# Patient Record
Sex: FEMALE | Race: BLACK OR AFRICAN AMERICAN | NOT HISPANIC OR LATINO | URBAN - METROPOLITAN AREA
[De-identification: names, ages, dates, MRNs, and addresses within clinical notes are randomized per-mention and may not be internally consistent; named-entity substitution may affect disease eponyms.]

---

## 2019-10-10 ENCOUNTER — EMERGENCY (EMERGENCY)
Facility: HOSPITAL | Age: 29
LOS: 1 days | Discharge: HOME | End: 2019-10-10
Attending: EMERGENCY MEDICINE | Admitting: EMERGENCY MEDICINE
Payer: COMMERCIAL

## 2019-10-10 VITALS
RESPIRATION RATE: 14 BRPM | HEART RATE: 86 BPM | OXYGEN SATURATION: 100 % | SYSTOLIC BLOOD PRESSURE: 123 MMHG | DIASTOLIC BLOOD PRESSURE: 82 MMHG | TEMPERATURE: 98 F

## 2019-10-10 LAB
ALBUMIN SERPL ELPH-MCNC: 4 G/DL — SIGNIFICANT CHANGE UP (ref 3.3–5)
ALP SERPL-CCNC: 60 U/L — SIGNIFICANT CHANGE UP (ref 40–120)
ALT FLD-CCNC: 18 U/L — SIGNIFICANT CHANGE UP (ref 4–33)
ANION GAP SERPL CALC-SCNC: 18 MMO/L — HIGH (ref 7–14)
APTT BLD: 26.3 SEC — LOW (ref 27.5–36.3)
AST SERPL-CCNC: 38 U/L — HIGH (ref 4–32)
BASE EXCESS BLDV CALC-SCNC: -5.7 MMOL/L — SIGNIFICANT CHANGE UP
BASOPHILS # BLD AUTO: 0.04 K/UL — SIGNIFICANT CHANGE UP (ref 0–0.2)
BASOPHILS NFR BLD AUTO: 0.4 % — SIGNIFICANT CHANGE UP (ref 0–2)
BILIRUB SERPL-MCNC: < 0.2 MG/DL — LOW (ref 0.2–1.2)
BLD GP AB SCN SERPL QL: NEGATIVE — SIGNIFICANT CHANGE UP
BLOOD GAS VENOUS - CREATININE: 1.15 MG/DL — SIGNIFICANT CHANGE UP (ref 0.5–1.3)
BLOOD GAS VENOUS - FIO2: 21 — SIGNIFICANT CHANGE UP
BUN SERPL-MCNC: 12 MG/DL — SIGNIFICANT CHANGE UP (ref 7–23)
CALCIUM SERPL-MCNC: 9.1 MG/DL — SIGNIFICANT CHANGE UP (ref 8.4–10.5)
CHLORIDE BLDV-SCNC: 112 MMOL/L — HIGH (ref 96–108)
CHLORIDE SERPL-SCNC: 104 MMOL/L — SIGNIFICANT CHANGE UP (ref 98–107)
CO2 SERPL-SCNC: 16 MMOL/L — LOW (ref 22–31)
CREAT SERPL-MCNC: 1.19 MG/DL — SIGNIFICANT CHANGE UP (ref 0.5–1.3)
EOSINOPHIL # BLD AUTO: 0.05 K/UL — SIGNIFICANT CHANGE UP (ref 0–0.5)
EOSINOPHIL NFR BLD AUTO: 0.5 % — SIGNIFICANT CHANGE UP (ref 0–6)
ETHANOL BLD-MCNC: 145 MG/DL — HIGH
GAS PNL BLDV: 137 MMOL/L — SIGNIFICANT CHANGE UP (ref 136–146)
GLUCOSE BLDV-MCNC: 99 MG/DL — SIGNIFICANT CHANGE UP (ref 70–99)
GLUCOSE SERPL-MCNC: 101 MG/DL — HIGH (ref 70–99)
HCO3 BLDV-SCNC: 20 MMOL/L — SIGNIFICANT CHANGE UP (ref 20–27)
HCT VFR BLD CALC: 38.6 % — SIGNIFICANT CHANGE UP (ref 34.5–45)
HCT VFR BLDV CALC: 40 % — SIGNIFICANT CHANGE UP (ref 34.5–45)
HGB BLD-MCNC: 12.8 G/DL — SIGNIFICANT CHANGE UP (ref 11.5–15.5)
HGB BLDV-MCNC: 13 G/DL — SIGNIFICANT CHANGE UP (ref 11.5–15.5)
IMM GRANULOCYTES NFR BLD AUTO: 0.3 % — SIGNIFICANT CHANGE UP (ref 0–1.5)
INR BLD: 0.96 — SIGNIFICANT CHANGE UP (ref 0.88–1.17)
LACTATE BLDV-MCNC: 4.1 MMOL/L — CRITICAL HIGH (ref 0.5–2)
LIDOCAIN IGE QN: 34.4 U/L — SIGNIFICANT CHANGE UP (ref 7–60)
LYMPHOCYTES # BLD AUTO: 3.59 K/UL — HIGH (ref 1–3.3)
LYMPHOCYTES # BLD AUTO: 34.4 % — SIGNIFICANT CHANGE UP (ref 13–44)
MCHC RBC-ENTMCNC: 30.1 PG — SIGNIFICANT CHANGE UP (ref 27–34)
MCHC RBC-ENTMCNC: 33.2 % — SIGNIFICANT CHANGE UP (ref 32–36)
MCV RBC AUTO: 90.8 FL — SIGNIFICANT CHANGE UP (ref 80–100)
MONOCYTES # BLD AUTO: 0.8 K/UL — SIGNIFICANT CHANGE UP (ref 0–0.9)
MONOCYTES NFR BLD AUTO: 7.7 % — SIGNIFICANT CHANGE UP (ref 2–14)
NEUTROPHILS # BLD AUTO: 5.92 K/UL — SIGNIFICANT CHANGE UP (ref 1.8–7.4)
NEUTROPHILS NFR BLD AUTO: 56.7 % — SIGNIFICANT CHANGE UP (ref 43–77)
NRBC # FLD: 0 K/UL — SIGNIFICANT CHANGE UP (ref 0–0)
PCO2 BLDV: 37 MMHG — LOW (ref 41–51)
PH BLDV: 7.34 PH — SIGNIFICANT CHANGE UP (ref 7.32–7.43)
PLATELET # BLD AUTO: 336 K/UL — SIGNIFICANT CHANGE UP (ref 150–400)
PMV BLD: 10.5 FL — SIGNIFICANT CHANGE UP (ref 7–13)
PO2 BLDV: 68 MMHG — HIGH (ref 35–40)
POTASSIUM BLDV-SCNC: 7.2 MMOL/L — CRITICAL HIGH (ref 3.4–4.5)
POTASSIUM SERPL-MCNC: 5.3 MMOL/L — SIGNIFICANT CHANGE UP (ref 3.5–5.3)
POTASSIUM SERPL-SCNC: 5.3 MMOL/L — SIGNIFICANT CHANGE UP (ref 3.5–5.3)
PROT SERPL-MCNC: 7.8 G/DL — SIGNIFICANT CHANGE UP (ref 6–8.3)
PROTHROM AB SERPL-ACNC: 10.9 SEC — SIGNIFICANT CHANGE UP (ref 9.8–13.1)
RBC # BLD: 4.25 M/UL — SIGNIFICANT CHANGE UP (ref 3.8–5.2)
RBC # FLD: 13.5 % — SIGNIFICANT CHANGE UP (ref 10.3–14.5)
RH IG SCN BLD-IMP: POSITIVE — SIGNIFICANT CHANGE UP
SAO2 % BLDV: 90.5 % — HIGH (ref 60–85)
SODIUM SERPL-SCNC: 138 MMOL/L — SIGNIFICANT CHANGE UP (ref 135–145)
WBC # BLD: 10.43 K/UL — SIGNIFICANT CHANGE UP (ref 3.8–10.5)
WBC # FLD AUTO: 10.43 K/UL — SIGNIFICANT CHANGE UP (ref 3.8–10.5)

## 2019-10-10 PROCEDURE — 72125 CT NECK SPINE W/O DYE: CPT | Mod: 26

## 2019-10-10 PROCEDURE — 73110 X-RAY EXAM OF WRIST: CPT | Mod: 26,RT

## 2019-10-10 PROCEDURE — 70450 CT HEAD/BRAIN W/O DYE: CPT | Mod: 26

## 2019-10-10 PROCEDURE — 73090 X-RAY EXAM OF FOREARM: CPT | Mod: 26,RT,76

## 2019-10-10 PROCEDURE — 73060 X-RAY EXAM OF HUMERUS: CPT | Mod: 26,RT

## 2019-10-10 PROCEDURE — 73030 X-RAY EXAM OF SHOULDER: CPT | Mod: 26,RT

## 2019-10-10 PROCEDURE — 71045 X-RAY EXAM CHEST 1 VIEW: CPT | Mod: 26

## 2019-10-10 PROCEDURE — 99285 EMERGENCY DEPT VISIT HI MDM: CPT

## 2019-10-10 PROCEDURE — 73120 X-RAY EXAM OF HAND: CPT | Mod: 26,RT

## 2019-10-10 PROCEDURE — 73130 X-RAY EXAM OF HAND: CPT | Mod: 26,RT

## 2019-10-10 RX ORDER — FENTANYL CITRATE 50 UG/ML
50 INJECTION INTRAVENOUS ONCE
Refills: 0 | Status: DISCONTINUED | OUTPATIENT
Start: 2019-10-10 | End: 2019-10-10

## 2019-10-10 RX ORDER — TETANUS TOXOID, REDUCED DIPHTHERIA TOXOID AND ACELLULAR PERTUSSIS VACCINE, ADSORBED 5; 2.5; 8; 8; 2.5 [IU]/.5ML; [IU]/.5ML; UG/.5ML; UG/.5ML; UG/.5ML
0.5 SUSPENSION INTRAMUSCULAR ONCE
Refills: 0 | Status: COMPLETED | OUTPATIENT
Start: 2019-10-10 | End: 2019-10-10

## 2019-10-10 RX ORDER — SODIUM CHLORIDE 9 MG/ML
1000 INJECTION INTRAMUSCULAR; INTRAVENOUS; SUBCUTANEOUS ONCE
Refills: 0 | Status: COMPLETED | OUTPATIENT
Start: 2019-10-10 | End: 2019-10-10

## 2019-10-10 RX ADMIN — FENTANYL CITRATE 50 MICROGRAM(S): 50 INJECTION INTRAVENOUS at 20:07

## 2019-10-10 RX ADMIN — FENTANYL CITRATE 50 MICROGRAM(S): 50 INJECTION INTRAVENOUS at 21:15

## 2019-10-10 RX ADMIN — SODIUM CHLORIDE 1000 MILLILITER(S): 9 INJECTION INTRAMUSCULAR; INTRAVENOUS; SUBCUTANEOUS at 21:17

## 2019-10-10 RX ADMIN — TETANUS TOXOID, REDUCED DIPHTHERIA TOXOID AND ACELLULAR PERTUSSIS VACCINE, ADSORBED 0.5 MILLILITER(S): 5; 2.5; 8; 8; 2.5 SUSPENSION INTRAMUSCULAR at 21:16

## 2019-10-10 NOTE — ED PROVIDER NOTE - CLINICAL SUMMARY MEDICAL DECISION MAKING FREE TEXT BOX
Patient presents to ed s/p accidental trip/fall down stairs, +head trauma, +r. wrist pain. C-collar placed, r. wrist elevated-nvi, compartments soft, concern for possible wrist fx. Otherwise neuro intact. Given mechanism and head trauma, will ct head, c-spine, xr RUE, chest/pelvis, pain ctrl, reass.

## 2019-10-10 NOTE — ED PROVIDER NOTE - OBJECTIVE STATEMENT
30 y/o f presents with wrist pain/arm pain s/p fall down stairs. On arrival to room 5, patient screaming stating she has pain to her arm. Per patient-she fell down the stairs after her foot got caught in carpet-approx 10 stairs, doesn't remember exactly how she landed but states she hit her head on the wall and put her hands out to stop her fall and her r. arm got caught. Does not think she lost consciousness. Now is c/o severe r. wrist/forearm pain, constant, radiates to hand. No numbness, able to feel her arm/wrist/fingers. Denies any vision changes, neck pain or stiffness, weakness, cp, sob, abd pain, vomiting, bowel/bladder incont. Ems was called at the time of the fall but patient declined the ride. Then mother brought her in by car. Patient was placed in c-collar on arrival. Tetanus out of date.

## 2019-10-10 NOTE — ED PROVIDER NOTE - PHYSICAL EXAMINATION
GEN - crying 2/2 pain, ao3  HEAD - hematoma to r. forehead, no other findings  EYES- PERRL, EOMI  ENT: Airway patent, mmm, Oral cavity and pharynx normal.    NECK: Neck supple, no midline ttp, no deformity/stepoff  PULMONARY - CTA b/l, symmetric breath sounds.   CARDIAC -s1s2, RRR, no M,G,R, no chest wall tenderness  ABDOMEN - +BS, ND, NT, soft, no guarding  BACK - no CVA tenderness, Normal  spine   EXTREMITIES - LUE, LLE, RLE with no tenderness, from. RUE-shoulder/humerus/elbow with from, no tenderness. R. wrist appears swollen, compartments soft, skin intact, tender to diffuse wrist region. Sensation intact, cap refill <2s, no crepitus, all finger moving, NVI.  SKIN - abrasions to face, no active bleeding.  NEUROLOGIC - alert, speech clear, 5/5 strength to lue, lle, rle, (RUE limited by pain), sensation grossly intact, cn2-12 intact.  PSYCH -crying, ao3.

## 2019-10-10 NOTE — ED ADULT TRIAGE NOTE - NS ED TRIAGE CLINICAL UPGRADE PROVIDER FT
Patient eyes started rolling back, became very pale and diaphoretic, panic button pressed- pt brought back to critical area. charge RN aware,

## 2019-10-10 NOTE — ED ADULT TRIAGE NOTE - CHIEF COMPLAINT QUOTE
pt fell down 10 steps today, c/o R wrist pain, has area of swelling and ecchymosis to R forehead, abrasions to R side of face. Denies LOC/ vision changes/ HA/ N/V/dizziness.

## 2019-10-10 NOTE — ED ADULT NURSE NOTE - OBJECTIVE STATEMENT
29yof a&ox3 presents to ed 5 s/p fall down 10 steps. in waiting room pt reportedly became pale, diaphoretic and weak. pt put in wheelchair and brought directly to room 5 w/ MD at bedside for eval. pt crying in pain upon arrival, IV lock placed and medicated for pain. pt reprots she slipped down the stairs and "caught" her arm while falling. pt reports severe pain from R elbow to R finger tips. + pulses, + sensation, equal b/l pt R wrist notably swollen, no obvious deformity. pt with hematoma to R forehead and scratches to R cheek and R leg. bruise noted to L thigh. scratch to L elbow. no active bleeding. pt family at bedside reports pt is "not herself" reports pt speech sounds slurred. pt brought to xray. report given to night RN.

## 2019-10-10 NOTE — ED PROVIDER NOTE - CARE PROVIDER_API CALL
Che Bowman)  Orthopedics  [address] [place] [address], [address]  [place] [address]  Phone: [phone]  Fax: [phone]  Follow Up Time:

## 2019-10-10 NOTE — ED PROVIDER NOTE - PROGRESS NOTE DETAILS
patient given fentanyl for pain, appears more comfortable, no longer crying. Nazanin Ragsdale M.D. Resident: Pt given percocet in ER and rx for oxycodone at home, pt ambulatory, well appearing, will DC home with ortho follow up in 1 week patient given fentanyl for pain, appears more comfortable, no longer crying. seen and treated by orthopedics for fx.

## 2019-10-10 NOTE — ED PROVIDER NOTE - NS ED ROS FT
ROS:  GENERAL: No fever, no chills  EYES: no change in vision  HEENT: no trouble swallowing, no trouble speaking  CARDIAC: no chest pain  PULMONARY: no cough, no shortness of breath  GI: no abdominal pain, no nausea, no vomiting, no diarrhea, no constipation  : No dysuria, no frequency, no change in appearance, or odor of urine  SKIN: no rashes  NEURO: no headache, no weakness  MSK: +r. wrist pain

## 2019-10-10 NOTE — ED PROVIDER NOTE - PATIENT PORTAL LINK FT
You can access the FollowMyHealth Patient Portal offered by [facility] by registering at the following website: [email or web address]. By joining [facility]’s FollowMyHealth portal, you will also be able to view your health information using other applications (apps) compatible with our system.

## 2019-10-10 NOTE — CONSULT NOTE ADULT - SUBJECTIVE AND OBJECTIVE BOX
HPI: 29y year old Female w/ hx of HTN, RHD s/p mechanical fall down 10 stairs.  Landed on outstretched hand.  Patient had deformity and pain in right wrist.  Patient denies pain in any other joint, numbness, tingling, paresthesias. No pain in any other extremities.  +Head trauma, no LOC. CT head and C-spine done and patient in C-collar.    PMH:  No pertinent past medical history    PSH:  No significant past surgical history    Allergies:  No Known Allergies    O:  T(C): 36.6 (10-10-19 @ 19:25), Max: 36.6 (10-10-19 @ 19:25)  HR: 86 (10-10-19 @ 19:25) (86 - 86)  BP: 123/82 (10-10-19 @ 19:25) (123/82 - 123/82)  RR: 14 (10-10-19 @ 19:25) (14 - 14)  SpO2: 100% (10-10-19 @ 19:25) (100% - 100%)    Gen  RUE  AIN/PIN/U Intact  SILT M/U/R  Radial pulse palpable, WWP distally  Skin intact  Swollen wrist    Imaging: XRs of the RUE show a comminuted intra-articular distal radius fracture    Patient underwent hematoma block.  Injected with 10 cc of 1% lidocaine without epinephrine into hematoma.  Hung in traction and reduced. Patient placed in sugartong splint.  Post reduction x-rays reviewed in improved alignment.    A/P 29y year old man with right distal radius fracture    Pain Control  NWB RUE  Sling for comfort  F/u as outpatient in 1 week with Dr. Bowman ([phone])  Discussed with patient that she will benefit from surgical management of her distal radius fracture    Serge Unger MD

## 2019-10-10 NOTE — ED PROVIDER NOTE - NSFOLLOWUPINSTRUCTIONS_ED_ALL_ED_FT
You were seen in the Emergency Department for a distal radius fracture.   1) Advance activity as tolerated.    2) Continue all previously prescribed medications as directed.  Take tylenol and motrin as directed for pain. You may also take oxycodone as directed for breakthrough pain.  3) Follow up with Dr. Bowman in one week - take copies of your results.  4) Return to the Emergency Department for worsening or persistent symptoms, and/or ANY NEW OR CONCERNING SYMPTOMS. If you have issues obtaining follow up, please call: [phone] to obtain a doctor or specialist who takes your insurance in your area.

## 2019-10-10 NOTE — CONSULT NOTE ADULT - ATTENDING COMMENTS
Agree with above. Patient will followup in clinic today and likely require surgery next week  on an outpatient basis.    KELSEY Bowman MD  Orthopedic Surgery

## 2019-10-11 ENCOUNTER — APPOINTMENT (OUTPATIENT)
Dept: ORTHOPEDIC SURGERY | Facility: CLINIC | Age: 29
End: 2019-10-11
Payer: COMMERCIAL

## 2019-10-11 VITALS
RESPIRATION RATE: 14 BRPM | OXYGEN SATURATION: 98 % | TEMPERATURE: 99 F | HEART RATE: 83 BPM | SYSTOLIC BLOOD PRESSURE: 103 MMHG | DIASTOLIC BLOOD PRESSURE: 63 MMHG

## 2019-10-11 VITALS
BODY MASS INDEX: 38.25 KG/M2 | DIASTOLIC BLOOD PRESSURE: 80 MMHG | HEIGHT: 66 IN | HEART RATE: 111 BPM | SYSTOLIC BLOOD PRESSURE: 117 MMHG | WEIGHT: 238 LBS

## 2019-10-11 DIAGNOSIS — Z78.9 OTHER SPECIFIED HEALTH STATUS: ICD-10-CM

## 2019-10-11 DIAGNOSIS — Z86.79 PERSONAL HISTORY OF OTHER DISEASES OF THE CIRCULATORY SYSTEM: ICD-10-CM

## 2019-10-11 PROBLEM — Z00.00 ENCOUNTER FOR PREVENTIVE HEALTH EXAMINATION: Status: ACTIVE | Noted: 2019-10-11

## 2019-10-11 LAB
APPEARANCE UR: CLEAR — SIGNIFICANT CHANGE UP
BACTERIA # UR AUTO: NEGATIVE — SIGNIFICANT CHANGE UP
BASE EXCESS BLDV CALC-SCNC: -3.3 MMOL/L — SIGNIFICANT CHANGE UP
BILIRUB UR-MCNC: NEGATIVE — SIGNIFICANT CHANGE UP
BLOOD GAS VENOUS - CREATININE: 0.77 MG/DL — SIGNIFICANT CHANGE UP (ref 0.5–1.3)
BLOOD GAS VENOUS - FIO2: 21 — SIGNIFICANT CHANGE UP
BLOOD UR QL VISUAL: HIGH
CHLORIDE BLDV-SCNC: 109 MMOL/L — HIGH (ref 96–108)
COLOR SPEC: SIGNIFICANT CHANGE UP
GAS PNL BLDV: 137 MMOL/L — SIGNIFICANT CHANGE UP (ref 136–146)
GLUCOSE BLDV-MCNC: 88 MG/DL — SIGNIFICANT CHANGE UP (ref 70–99)
GLUCOSE UR-MCNC: NEGATIVE — SIGNIFICANT CHANGE UP
HCG SERPL-ACNC: < 5 MIU/ML — SIGNIFICANT CHANGE UP
HCO3 BLDV-SCNC: 21 MMOL/L — SIGNIFICANT CHANGE UP (ref 20–27)
HCT VFR BLDV CALC: 37.2 % — SIGNIFICANT CHANGE UP (ref 34.5–45)
HGB BLDV-MCNC: 12.1 G/DL — SIGNIFICANT CHANGE UP (ref 11.5–15.5)
HYALINE CASTS # UR AUTO: NEGATIVE — SIGNIFICANT CHANGE UP
KETONES UR-MCNC: NEGATIVE — SIGNIFICANT CHANGE UP
LACTATE BLDV-MCNC: 2.4 MMOL/L — HIGH (ref 0.5–2)
LEUKOCYTE ESTERASE UR-ACNC: NEGATIVE — SIGNIFICANT CHANGE UP
NITRITE UR-MCNC: NEGATIVE — SIGNIFICANT CHANGE UP
PCO2 BLDV: 37 MMHG — LOW (ref 41–51)
PH BLDV: 7.38 PH — SIGNIFICANT CHANGE UP (ref 7.32–7.43)
PH UR: 6.5 — SIGNIFICANT CHANGE UP (ref 5–8)
PO2 BLDV: 46 MMHG — HIGH (ref 35–40)
POTASSIUM BLDV-SCNC: 4 MMOL/L — SIGNIFICANT CHANGE UP (ref 3.4–4.5)
PROT UR-MCNC: NEGATIVE — SIGNIFICANT CHANGE UP
RBC CASTS # UR COMP ASSIST: SIGNIFICANT CHANGE UP (ref 0–?)
SAO2 % BLDV: 75.1 % — SIGNIFICANT CHANGE UP (ref 60–85)
SP GR SPEC: 1.02 — SIGNIFICANT CHANGE UP (ref 1–1.04)
SQUAMOUS # UR AUTO: SIGNIFICANT CHANGE UP
UROBILINOGEN FLD QL: NORMAL — SIGNIFICANT CHANGE UP
WBC UR QL: SIGNIFICANT CHANGE UP (ref 0–?)

## 2019-10-11 PROCEDURE — 99203 OFFICE O/P NEW LOW 30 MIN: CPT

## 2019-10-11 RX ORDER — OXYCODONE HYDROCHLORIDE 5 MG/1
1 TABLET ORAL
Qty: 10 | Refills: 0
Start: 2019-10-11 | End: 2019-10-13

## 2019-10-11 RX ORDER — OXYCODONE AND ACETAMINOPHEN 5; 325 MG/1; MG/1
1 TABLET ORAL ONCE
Refills: 0 | Status: DISCONTINUED | OUTPATIENT
Start: 2019-10-11 | End: 2019-10-11

## 2019-10-11 RX ADMIN — OXYCODONE AND ACETAMINOPHEN 1 TABLET(S): 5; 325 TABLET ORAL at 01:07

## 2019-10-14 ENCOUNTER — OUTPATIENT (OUTPATIENT)
Dept: OUTPATIENT SERVICES | Facility: HOSPITAL | Age: 29
LOS: 1 days | End: 2019-10-14
Payer: COMMERCIAL

## 2019-10-14 ENCOUNTER — TRANSCRIPTION ENCOUNTER (OUTPATIENT)
Age: 29
End: 2019-10-14

## 2019-10-14 VITALS
RESPIRATION RATE: 16 BRPM | WEIGHT: 244.05 LBS | HEART RATE: 74 BPM | DIASTOLIC BLOOD PRESSURE: 85 MMHG | OXYGEN SATURATION: 98 % | TEMPERATURE: 98 F | HEIGHT: 66 IN | SYSTOLIC BLOOD PRESSURE: 138 MMHG

## 2019-10-14 DIAGNOSIS — S52.509A UNSPECIFIED FRACTURE OF THE LOWER END OF UNSPECIFIED RADIUS, INITIAL ENCOUNTER FOR CLOSED FRACTURE: ICD-10-CM

## 2019-10-14 DIAGNOSIS — Z98.890 OTHER SPECIFIED POSTPROCEDURAL STATES: Chronic | ICD-10-CM

## 2019-10-14 DIAGNOSIS — S52.501A UNSPECIFIED FRACTURE OF THE LOWER END OF RIGHT RADIUS, INITIAL ENCOUNTER FOR CLOSED FRACTURE: ICD-10-CM

## 2019-10-14 PROBLEM — Z78.9 DOES NOT USE ILLICIT DRUGS: Status: ACTIVE | Noted: 2019-10-11

## 2019-10-14 PROBLEM — Z78.9 CURRENT NON-SMOKER: Status: ACTIVE | Noted: 2019-10-11

## 2019-10-14 PROBLEM — Z86.79 HISTORY OF HYPERTENSION: Status: RESOLVED | Noted: 2019-10-11 | Resolved: 2019-10-14

## 2019-10-14 PROBLEM — Z78.9 CONSUMES ALCOHOL OCCASIONALLY: Status: ACTIVE | Noted: 2019-10-11

## 2019-10-14 PROCEDURE — G0463: CPT

## 2019-10-14 RX ORDER — CEFAZOLIN SODIUM 1 G
2000 VIAL (EA) INJECTION ONCE
Refills: 0 | Status: DISCONTINUED | OUTPATIENT
Start: 2019-10-15 | End: 2019-11-04

## 2019-10-14 RX ORDER — LIDOCAINE HCL 20 MG/ML
0.2 VIAL (ML) INJECTION ONCE
Refills: 0 | Status: DISCONTINUED | OUTPATIENT
Start: 2019-10-15 | End: 2019-11-04

## 2019-10-14 RX ORDER — CELECOXIB 200 MG/1
200 CAPSULE ORAL ONCE
Refills: 0 | Status: COMPLETED | OUTPATIENT
Start: 2019-10-15 | End: 2019-10-15

## 2019-10-14 RX ORDER — SODIUM CHLORIDE 9 MG/ML
3 INJECTION INTRAMUSCULAR; INTRAVENOUS; SUBCUTANEOUS EVERY 8 HOURS
Refills: 0 | Status: DISCONTINUED | OUTPATIENT
Start: 2019-10-15 | End: 2019-11-04

## 2019-10-14 RX ORDER — CHLORHEXIDINE GLUCONATE 213 G/1000ML
1 SOLUTION TOPICAL ONCE
Refills: 0 | Status: DISCONTINUED | OUTPATIENT
Start: 2019-10-15 | End: 2019-11-04

## 2019-10-14 RX ORDER — ACETAMINOPHEN 500 MG
1000 TABLET ORAL ONCE
Refills: 0 | Status: COMPLETED | OUTPATIENT
Start: 2019-10-15 | End: 2019-10-15

## 2019-10-14 NOTE — H&P PST ADULT - MUSCULOSKELETAL COMMENTS
hx fall sustained injury to right hand 10/10/2019 has hx shoulder and right rotator cuff tear  Sept 15,2019 get Physical therapy shoulder immobilizer arm sling  in place,  right hand  immobilizes with soft cast wearing  arm sling distal fingers positive for swelling  with good distal movement and circulation

## 2019-10-14 NOTE — ASSESSMENT
[FreeTextEntry1] : 29 year old female presents with right distal radius fracture. Treatment options were discussed, including operative and nonoperative treatment. At this time, the patient has opted for operative treatment. Risks, benefits and alternatives were discussed. Risks include but are not limited to the risks associated with anesthesia and the risks associated with the surgery. These include stiffness, need for additional procedures, damage to surrounding structures, infection, nonunion and malunion. I believe the patient understands these risks. The proposed procedure is right distal radius open reduction internal fixation. She will be scheduled at the next available date.

## 2019-10-14 NOTE — PHYSICAL EXAM
[de-identified] : Constitutional: Alert and in no acute distress.\par HEENT: scratches over her left face\par Psychiatric: Oriented to person, place, and time. Insight and judgement were intact and the affect was normal.\par Cardiovascular: Regular rate assessed through peripheral pulses.\par Pulmonary: Nonlabored breathing on room air.\par Lymphatics: No peripheral lymphadenopathy appreciated.\par \par Musculoskeletal: \par Splint not taken down. Remainder of the extremity is atraumatic. There is no tenderness over the volar tubercle of the scaphoid or anatomic snuffbox.\par \par Mobility is full about the shoulders and elbows. Digital flexion and extension is  limited. Thumb opposition is full to the base of the small fingers bilaterally. \par \par Sensation is intact to light touch in the ulnar, median, and radial nerve distributions. Motor is intact in the ulnar, median, and radial nerve distributions. Fingers are pink and well perfused. Capillary refill is brisk.\par   [de-identified] : Outside imaging reviewed show intraarticular distal radius fracture with continued malalignment after reduction attempt

## 2019-10-14 NOTE — H&P PST ADULT - HISTORY OF PRESENT ILLNESS
29 year old female with hx of obesity, on BCP , Has rotator cuff tear since Sept . 2019 goes to Physical therapy 2-3x weekly. Patient came in for PST today for open reduction internal fixation right distal radius. Patient has hx  fall  Thursday 10/10 and sustained  fracture on affected hand.  Patient was seen in ER [facility] had Xray and was positive for fracture . Referred to Dr Bolden, evaluated and now scheduled this procedure for surgical treatment,

## 2019-10-14 NOTE — H&P PST ADULT - NSICDXPROBLEM_GEN_ALL_CORE_FT
PROBLEM DIAGNOSES  Problem: Unspecified fracture of the lower end of right radius, initial encounter for closed fracture  Assessment and Plan: Open reduction internal fixation right distal  radius, PSt  instruction given , has CBC on sunrise done 10/10/2019, urine pregnancy test on DOS

## 2019-10-14 NOTE — HISTORY OF PRESENT ILLNESS
[Right] : right hand dominant [FreeTextEntry1] : She presents today for evaluation of her right distal radius fracture \par Date of injury: 10/10/19\par Method of injury: fall down stairs\par \par The patient initially presented to the emergency room where XR were obtained and the fracture was reduced.  A splint was applied and referral submitted to hand surgery for further follow-up. She describes the pain as 8/10. The pain is characterized as aching, cramping, and shooting in nature. Nothing seems to help the pain. Movement worsens the pain. \par

## 2019-10-15 ENCOUNTER — OUTPATIENT (OUTPATIENT)
Dept: OUTPATIENT SERVICES | Facility: HOSPITAL | Age: 29
LOS: 1 days | End: 2019-10-15
Payer: COMMERCIAL

## 2019-10-15 ENCOUNTER — APPOINTMENT (OUTPATIENT)
Dept: ORTHOPEDIC SURGERY | Facility: HOSPITAL | Age: 29
End: 2019-10-15

## 2019-10-15 VITALS
OXYGEN SATURATION: 98 % | SYSTOLIC BLOOD PRESSURE: 124 MMHG | TEMPERATURE: 98 F | RESPIRATION RATE: 16 BRPM | DIASTOLIC BLOOD PRESSURE: 78 MMHG | HEART RATE: 104 BPM

## 2019-10-15 VITALS
TEMPERATURE: 98 F | OXYGEN SATURATION: 98 % | RESPIRATION RATE: 14 BRPM | HEIGHT: 66 IN | WEIGHT: 244.05 LBS | SYSTOLIC BLOOD PRESSURE: 136 MMHG | HEART RATE: 76 BPM | DIASTOLIC BLOOD PRESSURE: 90 MMHG

## 2019-10-15 DIAGNOSIS — S52.509A UNSPECIFIED FRACTURE OF THE LOWER END OF UNSPECIFIED RADIUS, INITIAL ENCOUNTER FOR CLOSED FRACTURE: ICD-10-CM

## 2019-10-15 DIAGNOSIS — Z98.890 OTHER SPECIFIED POSTPROCEDURAL STATES: Chronic | ICD-10-CM

## 2019-10-15 PROCEDURE — 25650 CLTX ULNAR STYLOID FRACTURE: CPT

## 2019-10-15 PROCEDURE — C1713: CPT

## 2019-10-15 PROCEDURE — 25608 OPTX DST RD XART FX/EPI SEP2: CPT | Mod: RT

## 2019-10-15 PROCEDURE — C1889: CPT

## 2019-10-15 PROCEDURE — 76000 FLUOROSCOPY <1 HR PHYS/QHP: CPT

## 2019-10-15 RX ORDER — FAMOTIDINE 10 MG/ML
20 INJECTION INTRAVENOUS ONCE
Refills: 0 | Status: COMPLETED | OUTPATIENT
Start: 2019-10-15 | End: 2019-10-15

## 2019-10-15 RX ORDER — SODIUM CHLORIDE 9 MG/ML
1000 INJECTION, SOLUTION INTRAVENOUS
Refills: 0 | Status: DISCONTINUED | OUTPATIENT
Start: 2019-10-15 | End: 2019-11-04

## 2019-10-15 RX ORDER — NORETHINDRONE AND ETHINYL ESTRADIOL 0.4-0.035
1 KIT ORAL
Qty: 0 | Refills: 0 | DISCHARGE

## 2019-10-15 RX ORDER — OXYCODONE HYDROCHLORIDE 5 MG/1
1 TABLET ORAL
Qty: 15 | Refills: 0
Start: 2019-10-15

## 2019-10-15 RX ORDER — IBUPROFEN 200 MG
1 TABLET ORAL
Qty: 0 | Refills: 0 | DISCHARGE

## 2019-10-15 RX ORDER — ONDANSETRON 8 MG/1
4 TABLET, FILM COATED ORAL ONCE
Refills: 0 | Status: DISCONTINUED | OUTPATIENT
Start: 2019-10-15 | End: 2019-11-04

## 2019-10-15 RX ORDER — OXYCODONE HYDROCHLORIDE 5 MG/1
5 TABLET ORAL ONCE
Refills: 0 | Status: DISCONTINUED | OUTPATIENT
Start: 2019-10-15 | End: 2019-10-15

## 2019-10-15 RX ORDER — ACETAMINOPHEN 500 MG
0 TABLET ORAL
Qty: 0 | Refills: 0 | DISCHARGE

## 2019-10-15 RX ORDER — HYDROMORPHONE HYDROCHLORIDE 2 MG/ML
0.25 INJECTION INTRAMUSCULAR; INTRAVENOUS; SUBCUTANEOUS
Refills: 0 | Status: DISCONTINUED | OUTPATIENT
Start: 2019-10-15 | End: 2019-10-15

## 2019-10-15 RX ORDER — OXYCODONE HYDROCHLORIDE 5 MG/1
10 TABLET ORAL ONCE
Refills: 0 | Status: DISCONTINUED | OUTPATIENT
Start: 2019-10-15 | End: 2019-10-15

## 2019-10-15 RX ADMIN — CELECOXIB 200 MILLIGRAM(S): 200 CAPSULE ORAL at 15:12

## 2019-10-15 RX ADMIN — FAMOTIDINE 20 MILLIGRAM(S): 10 INJECTION INTRAVENOUS at 15:12

## 2019-10-15 RX ADMIN — Medication 1000 MILLIGRAM(S): at 15:12

## 2019-10-15 NOTE — PRE-ANESTHESIA EVALUATION ADULT - NSANTHOSAYNRD_GEN_A_CORE
No. MASSIEL screening performed.  STOP BANG Legend: 0-2 = LOW Risk; 3-4 = INTERMEDIATE Risk; 5-8 = HIGH Risk

## 2019-10-15 NOTE — ASU DISCHARGE PLAN (ADULT/PEDIATRIC) - CARE PROVIDER_API CALL
Che Bowman)  Orthopedics  [address] [place] [address], [address]  [place] [address]  Phone: [phone]  Fax: [phone]  Follow Up Time:

## 2019-10-15 NOTE — ASU PATIENT PROFILE, ADULT - VISION (WITH CORRECTIVE LENSES IF THE PATIENT USUALLY WEARS THEM):
wears glassess/Partially impaired: cannot see medication labels or newsprint, but can see obstacles in path, and the surrounding layout; can count fingers at arm's length

## 2019-10-15 NOTE — ASU DISCHARGE PLAN (ADULT/PEDIATRIC) - PAIN MANAGEMENT
[facility] next dose of pain medication @ 9:15 pm/Prescriptions [facility]

## 2019-10-15 NOTE — ASU DISCHARGE PLAN (ADULT/PEDIATRIC) - CALL YOUR DOCTOR IF YOU HAVE ANY OF THE FOLLOWING:
Wound/Surgical Site with redness, or foul smelling discharge or pus/Pain not relieved by Medications/Swelling that gets worse/Bleeding that does not stop/Numbness, tingling, color or temperature change to extremity/Fever greater than (need to indicate Fahrenheit or Celsius)

## 2019-10-15 NOTE — ASU PREOP CHECKLIST - LAST TOOK
5:15 AM TYLENOL 1000MG and micogestin with sip of water/clears 5:15 AM TYLENOL 1000MG and microgestin with sip of water/clears

## 2019-10-15 NOTE — PRE-ANESTHESIA EVALUATION ADULT - NSANTHADDINFOFT_GEN_ALL_CORE
Patient fell down the stairs on10/10, head first , went ER [facility] had CT scan head. Was told that Head CT was normal.  RBA of regional supraclavicular block explained. Patient understood, agreed to proceed.

## 2019-10-19 RX ORDER — CYCLOBENZAPRINE HYDROCHLORIDE 5 MG/1
5 TABLET, FILM COATED ORAL 3 TIMES DAILY
Qty: 15 | Refills: 0 | Status: ACTIVE | COMMUNITY
Start: 2019-10-19 | End: 1900-01-01

## 2019-10-24 ENCOUNTER — APPOINTMENT (OUTPATIENT)
Dept: ORTHOPEDIC SURGERY | Facility: CLINIC | Age: 29
End: 2019-10-24
Payer: COMMERCIAL

## 2019-10-24 PROBLEM — E66.9 OBESITY, UNSPECIFIED: Chronic | Status: ACTIVE | Noted: 2019-10-14

## 2019-10-24 PROBLEM — Z87.39 PERSONAL HISTORY OF OTHER DISEASES OF THE MUSCULOSKELETAL SYSTEM AND CONNECTIVE TISSUE: Chronic | Status: ACTIVE | Noted: 2019-10-14

## 2019-10-24 PROCEDURE — 73110 X-RAY EXAM OF WRIST: CPT | Mod: RT

## 2019-10-24 PROCEDURE — 99024 POSTOP FOLLOW-UP VISIT: CPT

## 2019-10-24 NOTE — HISTORY OF PRESENT ILLNESS
[de-identified] : DOS 10/15/19\par Procedure: right distal radius open reduction internal fixation  \par \par Doing well, had some muscle spasms over the weekend, has been taking flexeril which helps [de-identified] : right upper extremity\par incision c/d/i\par 3 cm to fist\par thumb IP held in flexion, full passive extension and flexion but feel spasm with extension\par fires thumb flexion\par SILT fdws/vif/vsf\par WWP [de-identified] : Three views of the right wrist were obtained and reviewed. No interval displacement of fracture. Hardware intact.  [de-identified] : Doing well [de-identified] : Sutures removed today. Prescription provided today for hand therapy. They will make her a thermoplastic brace and work on digit, ramesh thumb range of motion \par Return appointment will be scheduled for two weeks with XR

## 2019-11-07 ENCOUNTER — APPOINTMENT (OUTPATIENT)
Dept: ORTHOPEDIC SURGERY | Facility: CLINIC | Age: 29
End: 2019-11-07
Payer: COMMERCIAL

## 2019-11-07 PROCEDURE — 99024 POSTOP FOLLOW-UP VISIT: CPT

## 2019-11-07 PROCEDURE — 73110 X-RAY EXAM OF WRIST: CPT | Mod: RT

## 2019-11-07 RX ORDER — GABAPENTIN 300 MG/1
300 CAPSULE ORAL TWICE DAILY
Qty: 60 | Refills: 2 | Status: ACTIVE | COMMUNITY
Start: 2019-11-07 | End: 1900-01-01

## 2019-11-07 NOTE — HISTORY OF PRESENT ILLNESS
[de-identified] : DOS 10/15/19\par Procedure: right distal radius open reduction internal fixation  \par \par Pain improved. No more muscle spasms. Notes some paresthesias in superficial radial nerve distribution [de-identified] : right upper extremity\par incision c/d/i\par 2 cm to fist\par Able to actively flex and extend thumb\par SILT fdws/vif/vsf\par WWP [de-identified] : Three views of the right wrist were obtained and reviewed. No interval displacement of fracture. Hardware intact.  [de-identified] : Doing well [de-identified] : Sutures removed today. Prescription provided today for hand therapy. They will make her a thermoplastic brace and work on digit, ramesh thumb range of motion \par Return appointment will be scheduled for four weeks with XR

## 2019-12-02 ENCOUNTER — APPOINTMENT (OUTPATIENT)
Dept: ORTHOPEDIC SURGERY | Facility: CLINIC | Age: 29
End: 2019-12-02
Payer: COMMERCIAL

## 2019-12-02 PROCEDURE — 99024 POSTOP FOLLOW-UP VISIT: CPT

## 2019-12-02 PROCEDURE — 73110 X-RAY EXAM OF WRIST: CPT | Mod: RT

## 2019-12-02 PROCEDURE — 73080 X-RAY EXAM OF ELBOW: CPT | Mod: LT

## 2019-12-02 NOTE — HISTORY OF PRESENT ILLNESS
[de-identified] : DOS 10/15/19\par Procedure: right distal radius open reduction internal fixation  \par \par Pain improved. No more numbness. ALso reports left elbow pain since initial injury which has not been improving. Sometimes reports clicking of elbow and pain over olecranon [de-identified] : Three views of the right wrist were obtained and reviewed. No interval displacement of fracture. Hardware intact. \par \par Three views of the left elbow were obtained and reviewed - no evidence of fracture or dislocation [de-identified] : right upper extremity\par incision c/d/i\par 0 cm to fist\par Able to actively flex and extend thumb\par SILT fdws/vif/vsf\par WWP [de-identified] : Doing well [de-identified] : Will continue to work with hand therapy. Ok to stop brace. Ok to start stregnthening. Return appointment will be scheduled for three weeks with XR. We will order a left elbow MRI to evaluate for loose bodies

## 2019-12-03 ENCOUNTER — FORM ENCOUNTER (OUTPATIENT)
Age: 29
End: 2019-12-03

## 2019-12-04 ENCOUNTER — OUTPATIENT (OUTPATIENT)
Dept: OUTPATIENT SERVICES | Facility: HOSPITAL | Age: 29
LOS: 1 days | End: 2019-12-04
Payer: COMMERCIAL

## 2019-12-04 ENCOUNTER — APPOINTMENT (OUTPATIENT)
Dept: MRI IMAGING | Facility: CLINIC | Age: 29
End: 2019-12-04
Payer: COMMERCIAL

## 2019-12-04 DIAGNOSIS — Z00.8 ENCOUNTER FOR OTHER GENERAL EXAMINATION: ICD-10-CM

## 2019-12-04 DIAGNOSIS — Z98.890 OTHER SPECIFIED POSTPROCEDURAL STATES: Chronic | ICD-10-CM

## 2019-12-04 PROCEDURE — 73221 MRI JOINT UPR EXTREM W/O DYE: CPT

## 2019-12-04 PROCEDURE — 73221 MRI JOINT UPR EXTREM W/O DYE: CPT | Mod: 26,LT

## 2019-12-06 ENCOUNTER — APPOINTMENT (OUTPATIENT)
Dept: ORTHOPEDIC SURGERY | Facility: CLINIC | Age: 29
End: 2019-12-06
Payer: COMMERCIAL

## 2019-12-06 VITALS
SYSTOLIC BLOOD PRESSURE: 137 MMHG | BODY MASS INDEX: 39.37 KG/M2 | DIASTOLIC BLOOD PRESSURE: 87 MMHG | HEART RATE: 80 BPM | WEIGHT: 245 LBS | HEIGHT: 66 IN

## 2019-12-06 DIAGNOSIS — M25.522 PAIN IN LEFT ELBOW: ICD-10-CM

## 2019-12-06 PROCEDURE — 99214 OFFICE O/P EST MOD 30 MIN: CPT | Mod: 24

## 2019-12-15 PROBLEM — M25.522 LEFT ELBOW PAIN: Status: ACTIVE | Noted: 2019-12-02

## 2019-12-15 NOTE — HISTORY OF PRESENT ILLNESS
[Right] : right hand dominant [FreeTextEntry1] : DOS 10/15/19\par Procedure: right distal radius open reduction internal fixation\par \par At last visit she reported left elbow pain since her fall as well as clicking of the elbow. MRI of the elbow was ordered to evaluate for loose bodies. Patient returns today for MRI followup. Her right wrist is doing well. She continues to work with therapy.\par

## 2019-12-15 NOTE — PHYSICAL EXAM
[de-identified] : Constitutional: Alert and in no acute distress.\par Psychiatric: Oriented to person, place, and time. Insight and judgement were intact and the affect was normal.\par Cardiovascular: Regular rate assessed through peripheral pulses.\par Pulmonary: Nonlabored breathing on room air.\par Lymphatics: No peripheral lymphadenopathy appreciated.\par \par Musculoskeletal:\par \par Cervical spine mobility is full in all directions. \par \par No skin changes are noted to the upper extremities. Muscle bulk and contour are within normal limits without evidence of atrophy.\par \par Mobility is full about the elbows with flexion and extension. Forearm supination measures 85/85 degrees. Forearm pronation measures 85/85 degrees. Wrist flexion measured 50/75 degrees. She is tender over the posterior olecranon on the left. Wrist extension measures 50/65 degrees. Digital flexion and extension is full. Thumb opposition is full to the base of the small fingers bilaterally. Thumb abduction measures 5/5 in strength. Interosseous abduction measures 5/5 in strength. No cords or nodules are palpated. No triggering is observed. No tenderness over the thumb CMC articulations is observed. Scaphoid shift test is negative. Finkelstein test is negative. Scapholunate and lunotriquetral ballottement test are negative. Ulnar snuffbox tenderness is negative. Ulnar impingement test is negative. DRUJ piano key test is negative.\par \par Sensation is intact to light touch in the ulnar, median, and radial nerve distributions. Carpal tunnel provocative maneuvers are negative. Cubital tunnel provocative maneuvers are negative. Fingers are pink and well perfused. Capillary refill is brisk. [de-identified] : MRI shows no effusion or loose body

## 2019-12-15 NOTE — ASSESSMENT
[FreeTextEntry1] : 29 year old female presents with right distal radius fracture and left elbow pain. MRI shows no injury to elbow. Patient will return for her previously scheduled distal radius followup with XR of wrist.

## 2019-12-18 ENCOUNTER — APPOINTMENT (OUTPATIENT)
Dept: ORTHOPEDIC SURGERY | Facility: CLINIC | Age: 29
End: 2019-12-18
Payer: COMMERCIAL

## 2019-12-18 PROCEDURE — 73110 X-RAY EXAM OF WRIST: CPT | Mod: RT

## 2019-12-18 PROCEDURE — 99024 POSTOP FOLLOW-UP VISIT: CPT

## 2020-01-10 ENCOUNTER — APPOINTMENT (OUTPATIENT)
Dept: ORTHOPEDIC SURGERY | Facility: CLINIC | Age: 30
End: 2020-01-10
Payer: SELF-PAY

## 2020-01-10 PROCEDURE — 73110 X-RAY EXAM OF WRIST: CPT | Mod: RT

## 2020-01-10 PROCEDURE — 99024 POSTOP FOLLOW-UP VISIT: CPT

## 2020-01-20 NOTE — HISTORY OF PRESENT ILLNESS
[Doing Well] : is doing well [de-identified] : Doing well, ready to return to work\par \par \par \par \par  [de-identified] : DOS 10/15/19\par Procedure: right distal radius open reduction internal fixation.\par  [de-identified] : Three views of the right wrist were obtained and reviewed. No interval displacement of fracture. Hardware intact.  [de-identified] : right upper extremity \par incision well healed\par Full pronation/supination\par able to make a fist\par WF 50, WE 50\par SILT fdws/vif/vsf\par WWP [de-identified] : Doing well. Will continue therapy for strengthening

## 2020-02-21 ENCOUNTER — APPOINTMENT (OUTPATIENT)
Dept: ORTHOPEDIC SURGERY | Facility: CLINIC | Age: 30
End: 2020-02-21

## 2020-08-06 ENCOUNTER — APPOINTMENT (OUTPATIENT)
Dept: ORTHOPEDIC SURGERY | Facility: CLINIC | Age: 30
End: 2020-08-06
Payer: MEDICAID

## 2020-08-06 VITALS
WEIGHT: 250 LBS | SYSTOLIC BLOOD PRESSURE: 135 MMHG | HEART RATE: 95 BPM | BODY MASS INDEX: 40.18 KG/M2 | HEIGHT: 66 IN | DIASTOLIC BLOOD PRESSURE: 96 MMHG

## 2020-08-06 DIAGNOSIS — S62.109D FRACTURE OF UNSPECIFIED CARPAL BONE, UNSPECIFIED WRIST, SUBSEQUENT ENCOUNTER FOR FRACTURE WITH ROUTINE HEALING: ICD-10-CM

## 2020-08-06 PROCEDURE — 99214 OFFICE O/P EST MOD 30 MIN: CPT

## 2020-08-06 PROCEDURE — 73110 X-RAY EXAM OF WRIST: CPT | Mod: RT

## 2021-03-29 NOTE — H&P PST ADULT - NSANTHOSAYNRD_GEN_A_CORE
7.5 mm No. MASSIEL screening performed.  STOP BANG Legend: 0-2 = LOW Risk; 3-4 = INTERMEDIATE Risk; 5-8 = HIGH Risk

## 2022-08-11 NOTE — H&P PST ADULT - VENOUS THROMBOEMBOLISM
Quality 431: Preventive Care And Screening: Unhealthy Alcohol Use - Screening: Patient not identified as an unhealthy alcohol user when screened for unhealthy alcohol use using a systematic screening method Detail Level: Detailed Quality 226: Preventive Care And Screening: Tobacco Use: Screening And Cessation Intervention: Patient screened for tobacco use and is an ex/non-smoker Quality 130: Documentation Of Current Medications In The Medical Record: Current Medications Documented no

## 2024-02-23 NOTE — ASU PATIENT PROFILE, ADULT - PMH
Certified Ostomy Nurse Visit (30 minutes)  See Epic Ostomy Flowsheet for site assessment.     Reason for visit:  Ileostomy appliance change and supplies     Assessment:     Additional assessment notes: Additional supply left in room. Betsy NG updated. All questions answered at this time.     Stoma: 1 1/2, round, normal protrusion (stoma is not prolapsed, it is a loop)     Effluent: liquid brown stool     Appliance evaluation: intact     Ostomy RN Recommendations:                Peristomal skin care: Wash with warm water only; dry thoroughly.     Appliance: Ana Luisa 1 piece (#[id number]) cut to 1 1/2 with maria elena ring (#[id number])     Frequency of change: Every 4-7 days and as needed for leakage.   H/O rotator cuff tear  Sept. 2019 goe to  Physical 2-3 weekly  Obesity

## 2025-05-31 ENCOUNTER — INPATIENT (INPATIENT)
Facility: HOSPITAL | Age: 35
LOS: 6 days | Discharge: HOME | End: 2025-06-07
Attending: HOSPITALIST | Admitting: HOSPITALIST
Payer: COMMERCIAL

## 2025-05-31 VITALS
RESPIRATION RATE: 20 BRPM | SYSTOLIC BLOOD PRESSURE: 138 MMHG | DIASTOLIC BLOOD PRESSURE: 103 MMHG | OXYGEN SATURATION: 100 % | WEIGHT: 250 LBS | HEIGHT: 66 IN | TEMPERATURE: 99 F | HEART RATE: 93 BPM

## 2025-05-31 DIAGNOSIS — Z98.890 OTHER SPECIFIED POSTPROCEDURAL STATES: Chronic | ICD-10-CM

## 2025-05-31 LAB
ADD ON TEST-SPECIMEN IN LAB: SIGNIFICANT CHANGE UP
ALBUMIN SERPL ELPH-MCNC: 4.1 G/DL — SIGNIFICANT CHANGE UP (ref 3.3–5)
ALP SERPL-CCNC: 59 U/L — SIGNIFICANT CHANGE UP (ref 40–120)
ALT FLD-CCNC: 23 U/L — SIGNIFICANT CHANGE UP (ref 4–33)
ANION GAP SERPL CALC-SCNC: 17 MMOL/L — HIGH (ref 7–14)
AST SERPL-CCNC: 29 U/L — SIGNIFICANT CHANGE UP (ref 4–32)
BASOPHILS # BLD AUTO: 0.03 K/UL — SIGNIFICANT CHANGE UP (ref 0–0.2)
BASOPHILS NFR BLD AUTO: 0.2 % — SIGNIFICANT CHANGE UP (ref 0–2)
BILIRUB SERPL-MCNC: <0.2 MG/DL — SIGNIFICANT CHANGE UP (ref 0.2–1.2)
BUN SERPL-MCNC: 10 MG/DL — SIGNIFICANT CHANGE UP (ref 7–23)
CALCIUM SERPL-MCNC: 9.3 MG/DL — SIGNIFICANT CHANGE UP (ref 8.4–10.5)
CHLORIDE SERPL-SCNC: 101 MMOL/L — SIGNIFICANT CHANGE UP (ref 98–107)
CO2 SERPL-SCNC: 19 MMOL/L — LOW (ref 22–31)
CREAT SERPL-MCNC: 0.66 MG/DL — SIGNIFICANT CHANGE UP (ref 0.5–1.3)
EGFR: 119 ML/MIN/1.73M2 — SIGNIFICANT CHANGE UP
EGFR: 119 ML/MIN/1.73M2 — SIGNIFICANT CHANGE UP
EOSINOPHIL # BLD AUTO: 0.29 K/UL — SIGNIFICANT CHANGE UP (ref 0–0.5)
EOSINOPHIL NFR BLD AUTO: 1.9 % — SIGNIFICANT CHANGE UP (ref 0–6)
GLUCOSE SERPL-MCNC: 129 MG/DL — HIGH (ref 70–99)
HCG SERPL-ACNC: <1 MIU/ML — SIGNIFICANT CHANGE UP
HCT VFR BLD CALC: 38.6 % — SIGNIFICANT CHANGE UP (ref 34.5–45)
HGB BLD-MCNC: 13.8 G/DL — SIGNIFICANT CHANGE UP (ref 11.5–15.5)
IANC: 10.46 K/UL — HIGH (ref 1.8–7.4)
IMM GRANULOCYTES NFR BLD AUTO: 0.5 % — SIGNIFICANT CHANGE UP (ref 0–0.9)
LIDOCAIN IGE QN: >3000 U/L — SIGNIFICANT CHANGE UP (ref 7–60)
LYMPHOCYTES # BLD AUTO: 21.3 % — SIGNIFICANT CHANGE UP (ref 13–44)
LYMPHOCYTES # BLD AUTO: 3.19 K/UL — SIGNIFICANT CHANGE UP (ref 1–3.3)
MCHC RBC-ENTMCNC: 35.3 PG — HIGH (ref 27–34)
MCHC RBC-ENTMCNC: 35.8 G/DL — SIGNIFICANT CHANGE UP (ref 32–36)
MCV RBC AUTO: 98.7 FL — SIGNIFICANT CHANGE UP (ref 80–100)
MONOCYTES # BLD AUTO: 0.96 K/UL — HIGH (ref 0–0.9)
MONOCYTES NFR BLD AUTO: 6.4 % — SIGNIFICANT CHANGE UP (ref 2–14)
NEUTROPHILS # BLD AUTO: 10.46 K/UL — HIGH (ref 1.8–7.4)
NEUTROPHILS NFR BLD AUTO: 69.7 % — SIGNIFICANT CHANGE UP (ref 43–77)
NRBC # BLD AUTO: 0 K/UL — SIGNIFICANT CHANGE UP (ref 0–0)
NRBC # FLD: 0 K/UL — SIGNIFICANT CHANGE UP (ref 0–0)
NRBC BLD AUTO-RTO: 0 /100 WBCS — SIGNIFICANT CHANGE UP (ref 0–0)
PLATELET # BLD AUTO: 278 K/UL — SIGNIFICANT CHANGE UP (ref 150–400)
POTASSIUM SERPL-MCNC: 3.6 MMOL/L — SIGNIFICANT CHANGE UP (ref 3.5–5.3)
POTASSIUM SERPL-SCNC: 3.6 MMOL/L — SIGNIFICANT CHANGE UP (ref 3.5–5.3)
PROT SERPL-MCNC: 7.6 G/DL — SIGNIFICANT CHANGE UP (ref 6–8.3)
RBC # BLD: 3.91 M/UL — SIGNIFICANT CHANGE UP (ref 3.8–5.2)
RBC # FLD: 15.9 % — HIGH (ref 10.3–14.5)
SODIUM SERPL-SCNC: 137 MMOL/L — SIGNIFICANT CHANGE UP (ref 135–145)
WBC # BLD: 15 K/UL — HIGH (ref 3.8–10.5)
WBC # FLD AUTO: 15 K/UL — HIGH (ref 3.8–10.5)

## 2025-05-31 PROCEDURE — 99285 EMERGENCY DEPT VISIT HI MDM: CPT

## 2025-05-31 PROCEDURE — 76705 ECHO EXAM OF ABDOMEN: CPT | Mod: 26

## 2025-05-31 RX ORDER — ACETAMINOPHEN 500 MG/5ML
1000 LIQUID (ML) ORAL ONCE
Refills: 0 | Status: COMPLETED | OUTPATIENT
Start: 2025-05-31 | End: 2025-05-31

## 2025-05-31 RX ORDER — ONDANSETRON HCL/PF 4 MG/2 ML
4 VIAL (ML) INJECTION ONCE
Refills: 0 | Status: COMPLETED | OUTPATIENT
Start: 2025-05-31 | End: 2025-05-31

## 2025-05-31 RX ADMIN — Medication 400 MILLIGRAM(S): at 21:22

## 2025-05-31 RX ADMIN — Medication 1000 MILLILITER(S): at 23:21

## 2025-05-31 RX ADMIN — Medication 4 MILLIGRAM(S): at 21:25

## 2025-05-31 RX ADMIN — Medication 1000 MILLILITER(S): at 21:23

## 2025-05-31 RX ADMIN — Medication 20 MILLIGRAM(S): at 21:24

## 2025-05-31 RX ADMIN — Medication 4 MILLIGRAM(S): at 21:29

## 2025-05-31 RX ADMIN — Medication 4 MILLIGRAM(S): at 23:08

## 2025-06-01 DIAGNOSIS — F10.10 ALCOHOL ABUSE, UNCOMPLICATED: ICD-10-CM

## 2025-06-01 DIAGNOSIS — K85.90 ACUTE PANCREATITIS WITHOUT NECROSIS OR INFECTION, UNSPECIFIED: ICD-10-CM

## 2025-06-01 DIAGNOSIS — H20.10 CHRONIC IRIDOCYCLITIS, UNSPECIFIED EYE: ICD-10-CM

## 2025-06-01 DIAGNOSIS — Z29.9 ENCOUNTER FOR PROPHYLACTIC MEASURES, UNSPECIFIED: ICD-10-CM

## 2025-06-01 DIAGNOSIS — E78.5 HYPERLIPIDEMIA, UNSPECIFIED: ICD-10-CM

## 2025-06-01 DIAGNOSIS — Z98.890 OTHER SPECIFIED POSTPROCEDURAL STATES: Chronic | ICD-10-CM

## 2025-06-01 LAB
ALBUMIN SERPL ELPH-MCNC: 3.8 G/DL — SIGNIFICANT CHANGE UP (ref 3.3–5)
ALP SERPL-CCNC: 50 U/L — SIGNIFICANT CHANGE UP (ref 40–120)
ALT FLD-CCNC: 17 U/L — SIGNIFICANT CHANGE UP (ref 4–33)
ANION GAP SERPL CALC-SCNC: 12 MMOL/L — SIGNIFICANT CHANGE UP (ref 7–14)
APPEARANCE UR: CLEAR — SIGNIFICANT CHANGE UP
AST SERPL-CCNC: 17 U/L — SIGNIFICANT CHANGE UP (ref 4–32)
BASOPHILS # BLD AUTO: 0.03 K/UL — SIGNIFICANT CHANGE UP (ref 0–0.2)
BASOPHILS NFR BLD AUTO: 0.3 % — SIGNIFICANT CHANGE UP (ref 0–2)
BILIRUB SERPL-MCNC: 0.5 MG/DL — SIGNIFICANT CHANGE UP (ref 0.2–1.2)
BILIRUB UR-MCNC: NEGATIVE — SIGNIFICANT CHANGE UP
BUN SERPL-MCNC: 5 MG/DL — LOW (ref 7–23)
CALCIUM SERPL-MCNC: 8.3 MG/DL — LOW (ref 8.4–10.5)
CHLORIDE SERPL-SCNC: 102 MMOL/L — SIGNIFICANT CHANGE UP (ref 98–107)
CO2 SERPL-SCNC: 20 MMOL/L — LOW (ref 22–31)
COLOR SPEC: YELLOW — SIGNIFICANT CHANGE UP
CREAT SERPL-MCNC: 0.56 MG/DL — SIGNIFICANT CHANGE UP (ref 0.5–1.3)
DIFF PNL FLD: NEGATIVE — SIGNIFICANT CHANGE UP
EGFR: 124 ML/MIN/1.73M2 — SIGNIFICANT CHANGE UP
EGFR: 124 ML/MIN/1.73M2 — SIGNIFICANT CHANGE UP
EOSINOPHIL # BLD AUTO: 0.2 K/UL — SIGNIFICANT CHANGE UP (ref 0–0.5)
EOSINOPHIL NFR BLD AUTO: 2 % — SIGNIFICANT CHANGE UP (ref 0–6)
GLUCOSE SERPL-MCNC: 94 MG/DL — SIGNIFICANT CHANGE UP (ref 70–99)
GLUCOSE UR QL: NEGATIVE MG/DL — SIGNIFICANT CHANGE UP
HCT VFR BLD CALC: 34.1 % — LOW (ref 34.5–45)
HGB BLD-MCNC: 12 G/DL — SIGNIFICANT CHANGE UP (ref 11.5–15.5)
IANC: 6.49 K/UL — SIGNIFICANT CHANGE UP (ref 1.8–7.4)
IMM GRANULOCYTES NFR BLD AUTO: 0.2 % — SIGNIFICANT CHANGE UP (ref 0–0.9)
KETONES UR QL: 80 MG/DL
LEUKOCYTE ESTERASE UR-ACNC: NEGATIVE — SIGNIFICANT CHANGE UP
LYMPHOCYTES # BLD AUTO: 2.3 K/UL — SIGNIFICANT CHANGE UP (ref 1–3.3)
LYMPHOCYTES # BLD AUTO: 23.5 % — SIGNIFICANT CHANGE UP (ref 13–44)
MAGNESIUM SERPL-MCNC: 1.8 MG/DL — SIGNIFICANT CHANGE UP (ref 1.6–2.6)
MCHC RBC-ENTMCNC: 34.6 PG — HIGH (ref 27–34)
MCHC RBC-ENTMCNC: 35.2 G/DL — SIGNIFICANT CHANGE UP (ref 32–36)
MCV RBC AUTO: 98.3 FL — SIGNIFICANT CHANGE UP (ref 80–100)
MONOCYTES # BLD AUTO: 0.73 K/UL — SIGNIFICANT CHANGE UP (ref 0–0.9)
MONOCYTES NFR BLD AUTO: 7.5 % — SIGNIFICANT CHANGE UP (ref 2–14)
MRSA PCR RESULT.: SIGNIFICANT CHANGE UP
NEUTROPHILS # BLD AUTO: 6.49 K/UL — SIGNIFICANT CHANGE UP (ref 1.8–7.4)
NEUTROPHILS NFR BLD AUTO: 66.5 % — SIGNIFICANT CHANGE UP (ref 43–77)
NITRITE UR-MCNC: NEGATIVE — SIGNIFICANT CHANGE UP
NRBC # BLD AUTO: 0 K/UL — SIGNIFICANT CHANGE UP (ref 0–0)
NRBC # FLD: 0 K/UL — SIGNIFICANT CHANGE UP (ref 0–0)
NRBC BLD AUTO-RTO: 0 /100 WBCS — SIGNIFICANT CHANGE UP (ref 0–0)
PH UR: 5.5 — SIGNIFICANT CHANGE UP (ref 5–8)
PHOSPHATE SERPL-MCNC: 3.1 MG/DL — SIGNIFICANT CHANGE UP (ref 2.5–4.5)
PLATELET # BLD AUTO: 232 K/UL — SIGNIFICANT CHANGE UP (ref 150–400)
POTASSIUM SERPL-MCNC: 3.3 MMOL/L — LOW (ref 3.5–5.3)
POTASSIUM SERPL-SCNC: 3.3 MMOL/L — LOW (ref 3.5–5.3)
PROT SERPL-MCNC: 6.8 G/DL — SIGNIFICANT CHANGE UP (ref 6–8.3)
PROT UR-MCNC: SIGNIFICANT CHANGE UP MG/DL
RBC # BLD: 3.47 M/UL — LOW (ref 3.8–5.2)
RBC # FLD: 16.2 % — HIGH (ref 10.3–14.5)
S AUREUS DNA NOSE QL NAA+PROBE: SIGNIFICANT CHANGE UP
SODIUM SERPL-SCNC: 134 MMOL/L — LOW (ref 135–145)
SP GR SPEC: 1.03 — HIGH (ref 1–1.03)
UROBILINOGEN FLD QL: 0.2 MG/DL — SIGNIFICANT CHANGE UP (ref 0.2–1)
WBC # BLD: 9.77 K/UL — SIGNIFICANT CHANGE UP (ref 3.8–10.5)
WBC # FLD AUTO: 9.77 K/UL — SIGNIFICANT CHANGE UP (ref 3.8–10.5)

## 2025-06-01 PROCEDURE — 99223 1ST HOSP IP/OBS HIGH 75: CPT

## 2025-06-01 PROCEDURE — 74177 CT ABD & PELVIS W/CONTRAST: CPT | Mod: 26

## 2025-06-01 RX ORDER — SENNA 187 MG
2 TABLET ORAL AT BEDTIME
Refills: 0 | Status: DISCONTINUED | OUTPATIENT
Start: 2025-06-01 | End: 2025-06-03

## 2025-06-01 RX ORDER — ACETAMINOPHEN 500 MG/5ML
650 LIQUID (ML) ORAL EVERY 6 HOURS
Refills: 0 | Status: DISCONTINUED | OUTPATIENT
Start: 2025-06-01 | End: 2025-06-07

## 2025-06-01 RX ORDER — ONDANSETRON HCL/PF 4 MG/2 ML
4 VIAL (ML) INJECTION EVERY 8 HOURS
Refills: 0 | Status: DISCONTINUED | OUTPATIENT
Start: 2025-06-01 | End: 2025-06-07

## 2025-06-01 RX ORDER — MELATONIN 5 MG
3 TABLET ORAL AT BEDTIME
Refills: 0 | Status: DISCONTINUED | OUTPATIENT
Start: 2025-06-01 | End: 2025-06-07

## 2025-06-01 RX ORDER — ADALIMUMAB-ADAZ 40 MG/.4ML
40 INJECTION, SOLUTION SUBCUTANEOUS
Refills: 0 | DISCHARGE

## 2025-06-01 RX ORDER — POLYETHYLENE GLYCOL 3350 17 G/17G
17 POWDER, FOR SOLUTION ORAL DAILY
Refills: 0 | Status: DISCONTINUED | OUTPATIENT
Start: 2025-06-01 | End: 2025-06-03

## 2025-06-01 RX ORDER — MAGNESIUM, ALUMINUM HYDROXIDE 200-200 MG
30 TABLET,CHEWABLE ORAL EVERY 4 HOURS
Refills: 0 | Status: DISCONTINUED | OUTPATIENT
Start: 2025-06-01 | End: 2025-06-07

## 2025-06-01 RX ORDER — BISACODYL 5 MG
5 TABLET, DELAYED RELEASE (ENTERIC COATED) ORAL DAILY
Refills: 0 | Status: DISCONTINUED | OUTPATIENT
Start: 2025-06-01 | End: 2025-06-07

## 2025-06-01 RX ORDER — NALOXONE HYDROCHLORIDE 0.4 MG/ML
0.4 INJECTION, SOLUTION INTRAMUSCULAR; INTRAVENOUS; SUBCUTANEOUS ONCE
Refills: 0 | Status: DISCONTINUED | OUTPATIENT
Start: 2025-06-01 | End: 2025-06-07

## 2025-06-01 RX ORDER — ENOXAPARIN SODIUM 100 MG/ML
40 INJECTION SUBCUTANEOUS EVERY 12 HOURS
Refills: 0 | Status: DISCONTINUED | OUTPATIENT
Start: 2025-06-01 | End: 2025-06-07

## 2025-06-01 RX ORDER — SODIUM CHLORIDE 9 G/1000ML
1000 INJECTION, SOLUTION INTRAVENOUS
Refills: 0 | Status: DISCONTINUED | OUTPATIENT
Start: 2025-06-01 | End: 2025-06-07

## 2025-06-01 RX ADMIN — Medication 4 MILLIGRAM(S): at 01:54

## 2025-06-01 RX ADMIN — Medication 2 TABLET(S): at 21:20

## 2025-06-01 RX ADMIN — Medication 4 MILLIGRAM(S): at 17:29

## 2025-06-01 RX ADMIN — SODIUM CHLORIDE 150 MILLILITER(S): 9 INJECTION, SOLUTION INTRAVENOUS at 20:20

## 2025-06-01 RX ADMIN — ENOXAPARIN SODIUM 40 MILLIGRAM(S): 100 INJECTION SUBCUTANEOUS at 07:41

## 2025-06-01 RX ADMIN — Medication 40 MILLIEQUIVALENT(S): at 17:20

## 2025-06-01 RX ADMIN — Medication 4 MILLIGRAM(S): at 10:55

## 2025-06-01 RX ADMIN — Medication 2 MILLIGRAM(S): at 04:08

## 2025-06-01 RX ADMIN — Medication 4 MILLIGRAM(S): at 18:25

## 2025-06-01 RX ADMIN — Medication 4 MILLIGRAM(S): at 23:17

## 2025-06-01 RX ADMIN — SODIUM CHLORIDE 150 MILLILITER(S): 9 INJECTION, SOLUTION INTRAVENOUS at 23:19

## 2025-06-01 RX ADMIN — Medication 1 APPLICATION(S): at 12:40

## 2025-06-01 RX ADMIN — ENOXAPARIN SODIUM 40 MILLIGRAM(S): 100 INJECTION SUBCUTANEOUS at 17:25

## 2025-06-01 RX ADMIN — Medication 4 MILLIGRAM(S): at 09:57

## 2025-06-01 RX ADMIN — POLYETHYLENE GLYCOL 3350 17 GRAM(S): 17 POWDER, FOR SOLUTION ORAL at 12:39

## 2025-06-01 RX ADMIN — SODIUM CHLORIDE 150 MILLILITER(S): 9 INJECTION, SOLUTION INTRAVENOUS at 07:40

## 2025-06-02 LAB
ALBUMIN SERPL ELPH-MCNC: 3.7 G/DL — SIGNIFICANT CHANGE UP (ref 3.3–5)
ALP SERPL-CCNC: 54 U/L — SIGNIFICANT CHANGE UP (ref 40–120)
ALT FLD-CCNC: 13 U/L — SIGNIFICANT CHANGE UP (ref 4–33)
ANION GAP SERPL CALC-SCNC: 14 MMOL/L — SIGNIFICANT CHANGE UP (ref 7–14)
AST SERPL-CCNC: 19 U/L — SIGNIFICANT CHANGE UP (ref 4–32)
BILIRUB SERPL-MCNC: 0.6 MG/DL — SIGNIFICANT CHANGE UP (ref 0.2–1.2)
BUN SERPL-MCNC: 3 MG/DL — LOW (ref 7–23)
CALCIUM SERPL-MCNC: 8.4 MG/DL — SIGNIFICANT CHANGE UP (ref 8.4–10.5)
CHLORIDE SERPL-SCNC: 99 MMOL/L — SIGNIFICANT CHANGE UP (ref 98–107)
CO2 SERPL-SCNC: 22 MMOL/L — SIGNIFICANT CHANGE UP (ref 22–31)
CREAT SERPL-MCNC: 0.58 MG/DL — SIGNIFICANT CHANGE UP (ref 0.5–1.3)
EGFR: 122 ML/MIN/1.73M2 — SIGNIFICANT CHANGE UP
EGFR: 122 ML/MIN/1.73M2 — SIGNIFICANT CHANGE UP
GLUCOSE SERPL-MCNC: 93 MG/DL — SIGNIFICANT CHANGE UP (ref 70–99)
HCT VFR BLD CALC: 39.5 % — SIGNIFICANT CHANGE UP (ref 34.5–45)
HGB BLD-MCNC: 13.3 G/DL — SIGNIFICANT CHANGE UP (ref 11.5–15.5)
IGG4 SER-MCNC: 19.7 MG/DL — SIGNIFICANT CHANGE UP (ref 1–123)
MAGNESIUM SERPL-MCNC: 1.9 MG/DL — SIGNIFICANT CHANGE UP (ref 1.6–2.6)
MCHC RBC-ENTMCNC: 33.7 G/DL — SIGNIFICANT CHANGE UP (ref 32–36)
MCHC RBC-ENTMCNC: 34.9 PG — HIGH (ref 27–34)
MCV RBC AUTO: 103.7 FL — HIGH (ref 80–100)
NRBC # BLD AUTO: 0 K/UL — SIGNIFICANT CHANGE UP (ref 0–0)
NRBC # FLD: 0 K/UL — SIGNIFICANT CHANGE UP (ref 0–0)
NRBC BLD AUTO-RTO: 0 /100 WBCS — SIGNIFICANT CHANGE UP (ref 0–0)
PHOSPHATE SERPL-MCNC: 2.8 MG/DL — SIGNIFICANT CHANGE UP (ref 2.5–4.5)
PLATELET # BLD AUTO: 259 K/UL — SIGNIFICANT CHANGE UP (ref 150–400)
POTASSIUM SERPL-MCNC: 3.3 MMOL/L — LOW (ref 3.5–5.3)
POTASSIUM SERPL-SCNC: 3.3 MMOL/L — LOW (ref 3.5–5.3)
PROT SERPL-MCNC: 6.6 G/DL — SIGNIFICANT CHANGE UP (ref 6–8.3)
RBC # BLD: 3.81 M/UL — SIGNIFICANT CHANGE UP (ref 3.8–5.2)
RBC # FLD: 17.2 % — HIGH (ref 10.3–14.5)
SODIUM SERPL-SCNC: 135 MMOL/L — SIGNIFICANT CHANGE UP (ref 135–145)
WBC # BLD: 12.41 K/UL — HIGH (ref 3.8–10.5)
WBC # FLD AUTO: 12.41 K/UL — HIGH (ref 3.8–10.5)

## 2025-06-02 PROCEDURE — 99233 SBSQ HOSP IP/OBS HIGH 50: CPT

## 2025-06-02 RX ORDER — ACETAMINOPHEN 500 MG/5ML
1000 LIQUID (ML) ORAL ONCE
Refills: 0 | Status: COMPLETED | OUTPATIENT
Start: 2025-06-02 | End: 2025-06-02

## 2025-06-02 RX ADMIN — Medication 400 MILLIGRAM(S): at 07:55

## 2025-06-02 RX ADMIN — Medication 400 MILLIGRAM(S): at 00:19

## 2025-06-02 RX ADMIN — SODIUM CHLORIDE 150 MILLILITER(S): 9 INJECTION, SOLUTION INTRAVENOUS at 07:55

## 2025-06-02 RX ADMIN — Medication 1 APPLICATION(S): at 17:39

## 2025-06-02 RX ADMIN — Medication 4 MILLIGRAM(S): at 16:30

## 2025-06-02 RX ADMIN — Medication 40 MILLIEQUIVALENT(S): at 09:12

## 2025-06-02 RX ADMIN — Medication 40 MILLIEQUIVALENT(S): at 13:14

## 2025-06-02 RX ADMIN — ENOXAPARIN SODIUM 40 MILLIGRAM(S): 100 INJECTION SUBCUTANEOUS at 05:18

## 2025-06-02 RX ADMIN — Medication 1000 MILLIGRAM(S): at 09:00

## 2025-06-02 RX ADMIN — Medication 4 MILLIGRAM(S): at 07:56

## 2025-06-02 RX ADMIN — Medication 4 MILLIGRAM(S): at 00:15

## 2025-06-02 RX ADMIN — Medication 1000 MILLIGRAM(S): at 16:30

## 2025-06-02 RX ADMIN — SODIUM CHLORIDE 150 MILLILITER(S): 9 INJECTION, SOLUTION INTRAVENOUS at 20:22

## 2025-06-02 RX ADMIN — POLYETHYLENE GLYCOL 3350 17 GRAM(S): 17 POWDER, FOR SOLUTION ORAL at 09:12

## 2025-06-02 RX ADMIN — ENOXAPARIN SODIUM 40 MILLIGRAM(S): 100 INJECTION SUBCUTANEOUS at 17:40

## 2025-06-02 RX ADMIN — Medication 400 MILLIGRAM(S): at 15:30

## 2025-06-02 RX ADMIN — SODIUM CHLORIDE 150 MILLILITER(S): 9 INJECTION, SOLUTION INTRAVENOUS at 05:58

## 2025-06-02 RX ADMIN — Medication 4 MILLIGRAM(S): at 15:29

## 2025-06-02 RX ADMIN — SODIUM CHLORIDE 150 MILLILITER(S): 9 INJECTION, SOLUTION INTRAVENOUS at 17:40

## 2025-06-02 RX ADMIN — Medication 1000 MILLIGRAM(S): at 00:44

## 2025-06-02 RX ADMIN — Medication 4 MILLIGRAM(S): at 09:00

## 2025-06-03 DIAGNOSIS — R19.5 OTHER FECAL ABNORMALITIES: ICD-10-CM

## 2025-06-03 LAB
ANION GAP SERPL CALC-SCNC: 14 MMOL/L — SIGNIFICANT CHANGE UP (ref 7–14)
BUN SERPL-MCNC: 3 MG/DL — LOW (ref 7–23)
CALCIUM SERPL-MCNC: 8.3 MG/DL — LOW (ref 8.4–10.5)
CHLORIDE SERPL-SCNC: 102 MMOL/L — SIGNIFICANT CHANGE UP (ref 98–107)
CO2 SERPL-SCNC: 20 MMOL/L — LOW (ref 22–31)
CREAT SERPL-MCNC: 0.53 MG/DL — SIGNIFICANT CHANGE UP (ref 0.5–1.3)
EGFR: 125 ML/MIN/1.73M2 — SIGNIFICANT CHANGE UP
EGFR: 125 ML/MIN/1.73M2 — SIGNIFICANT CHANGE UP
GLUCOSE SERPL-MCNC: 87 MG/DL — SIGNIFICANT CHANGE UP (ref 70–99)
HCT VFR BLD CALC: 30.5 % — LOW (ref 34.5–45)
HGB BLD-MCNC: 10.4 G/DL — LOW (ref 11.5–15.5)
MAGNESIUM SERPL-MCNC: 1.9 MG/DL — SIGNIFICANT CHANGE UP (ref 1.6–2.6)
MCHC RBC-ENTMCNC: 34.1 G/DL — SIGNIFICANT CHANGE UP (ref 32–36)
MCHC RBC-ENTMCNC: 34.2 PG — HIGH (ref 27–34)
MCV RBC AUTO: 100.3 FL — HIGH (ref 80–100)
NRBC # BLD AUTO: 0 K/UL — SIGNIFICANT CHANGE UP (ref 0–0)
NRBC # FLD: 0 K/UL — SIGNIFICANT CHANGE UP (ref 0–0)
NRBC BLD AUTO-RTO: 0 /100 WBCS — SIGNIFICANT CHANGE UP (ref 0–0)
PHOSPHATE SERPL-MCNC: 3.1 MG/DL — SIGNIFICANT CHANGE UP (ref 2.5–4.5)
PLATELET # BLD AUTO: 226 K/UL — SIGNIFICANT CHANGE UP (ref 150–400)
POTASSIUM SERPL-MCNC: 3.3 MMOL/L — LOW (ref 3.5–5.3)
POTASSIUM SERPL-SCNC: 3.3 MMOL/L — LOW (ref 3.5–5.3)
RBC # BLD: 3.04 M/UL — LOW (ref 3.8–5.2)
RBC # FLD: 16.4 % — HIGH (ref 10.3–14.5)
SODIUM SERPL-SCNC: 136 MMOL/L — SIGNIFICANT CHANGE UP (ref 135–145)
WBC # BLD: 9.59 K/UL — SIGNIFICANT CHANGE UP (ref 3.8–10.5)
WBC # FLD AUTO: 9.59 K/UL — SIGNIFICANT CHANGE UP (ref 3.8–10.5)

## 2025-06-03 PROCEDURE — 99233 SBSQ HOSP IP/OBS HIGH 50: CPT

## 2025-06-03 RX ORDER — LANOLIN/MINERAL OIL/PETROLATUM
1 OINTMENT (GRAM) OPHTHALMIC (EYE) ONCE
Refills: 0 | Status: DISCONTINUED | OUTPATIENT
Start: 2025-06-03 | End: 2025-06-07

## 2025-06-03 RX ORDER — ACETAMINOPHEN 500 MG/5ML
1000 LIQUID (ML) ORAL ONCE
Refills: 0 | Status: COMPLETED | OUTPATIENT
Start: 2025-06-03 | End: 2025-06-03

## 2025-06-03 RX ADMIN — Medication 100 MILLIEQUIVALENT(S): at 11:37

## 2025-06-03 RX ADMIN — Medication 4 MILLIGRAM(S): at 10:30

## 2025-06-03 RX ADMIN — Medication 400 MILLIGRAM(S): at 02:02

## 2025-06-03 RX ADMIN — Medication 4 MILLIGRAM(S): at 01:57

## 2025-06-03 RX ADMIN — ENOXAPARIN SODIUM 40 MILLIGRAM(S): 100 INJECTION SUBCUTANEOUS at 05:09

## 2025-06-03 RX ADMIN — Medication 4 MILLIGRAM(S): at 09:34

## 2025-06-03 RX ADMIN — ENOXAPARIN SODIUM 40 MILLIGRAM(S): 100 INJECTION SUBCUTANEOUS at 18:49

## 2025-06-03 RX ADMIN — Medication 650 MILLIGRAM(S): at 09:39

## 2025-06-03 RX ADMIN — SODIUM CHLORIDE 150 MILLILITER(S): 9 INJECTION, SOLUTION INTRAVENOUS at 09:17

## 2025-06-03 RX ADMIN — Medication 100 MILLIEQUIVALENT(S): at 12:55

## 2025-06-03 RX ADMIN — Medication 4 MILLIGRAM(S): at 23:30

## 2025-06-03 RX ADMIN — Medication 100 MILLIEQUIVALENT(S): at 13:39

## 2025-06-03 RX ADMIN — SODIUM CHLORIDE 150 MILLILITER(S): 9 INJECTION, SOLUTION INTRAVENOUS at 23:01

## 2025-06-03 RX ADMIN — Medication 4 MILLIGRAM(S): at 02:33

## 2025-06-03 RX ADMIN — Medication 650 MILLIGRAM(S): at 10:30

## 2025-06-03 RX ADMIN — Medication 650 MILLIGRAM(S): at 23:02

## 2025-06-03 RX ADMIN — Medication 4 MILLIGRAM(S): at 15:47

## 2025-06-03 RX ADMIN — Medication 1 APPLICATION(S): at 13:01

## 2025-06-03 RX ADMIN — Medication 4 MILLIGRAM(S): at 23:04

## 2025-06-03 RX ADMIN — SODIUM CHLORIDE 150 MILLILITER(S): 9 INJECTION, SOLUTION INTRAVENOUS at 16:09

## 2025-06-03 RX ADMIN — Medication 1000 MILLIGRAM(S): at 02:33

## 2025-06-04 LAB
ANION GAP SERPL CALC-SCNC: 13 MMOL/L — SIGNIFICANT CHANGE UP (ref 7–14)
BASOPHILS # BLD AUTO: 0.05 K/UL — SIGNIFICANT CHANGE UP (ref 0–0.2)
BASOPHILS NFR BLD AUTO: 0.5 % — SIGNIFICANT CHANGE UP (ref 0–2)
BUN SERPL-MCNC: 2 MG/DL — LOW (ref 7–23)
CALCIUM SERPL-MCNC: 9.2 MG/DL — SIGNIFICANT CHANGE UP (ref 8.4–10.5)
CHLORIDE SERPL-SCNC: 103 MMOL/L — SIGNIFICANT CHANGE UP (ref 98–107)
CO2 SERPL-SCNC: 22 MMOL/L — SIGNIFICANT CHANGE UP (ref 22–31)
CREAT SERPL-MCNC: 0.57 MG/DL — SIGNIFICANT CHANGE UP (ref 0.5–1.3)
EGFR: 123 ML/MIN/1.73M2 — SIGNIFICANT CHANGE UP
EGFR: 123 ML/MIN/1.73M2 — SIGNIFICANT CHANGE UP
EOSINOPHIL # BLD AUTO: 0.52 K/UL — HIGH (ref 0–0.5)
EOSINOPHIL NFR BLD AUTO: 5.1 % — SIGNIFICANT CHANGE UP (ref 0–6)
GLUCOSE SERPL-MCNC: 76 MG/DL — SIGNIFICANT CHANGE UP (ref 70–99)
HCT VFR BLD CALC: 36.3 % — SIGNIFICANT CHANGE UP (ref 34.5–45)
HGB BLD-MCNC: 12.5 G/DL — SIGNIFICANT CHANGE UP (ref 11.5–15.5)
IANC: 5.71 K/UL — SIGNIFICANT CHANGE UP (ref 1.8–7.4)
IMM GRANULOCYTES NFR BLD AUTO: 0.4 % — SIGNIFICANT CHANGE UP (ref 0–0.9)
LYMPHOCYTES # BLD AUTO: 2.56 K/UL — SIGNIFICANT CHANGE UP (ref 1–3.3)
LYMPHOCYTES # BLD AUTO: 25.1 % — SIGNIFICANT CHANGE UP (ref 13–44)
MAGNESIUM SERPL-MCNC: 2.1 MG/DL — SIGNIFICANT CHANGE UP (ref 1.6–2.6)
MCHC RBC-ENTMCNC: 34.4 G/DL — SIGNIFICANT CHANGE UP (ref 32–36)
MCHC RBC-ENTMCNC: 34.4 PG — HIGH (ref 27–34)
MCV RBC AUTO: 100 FL — SIGNIFICANT CHANGE UP (ref 80–100)
MONOCYTES # BLD AUTO: 1.3 K/UL — HIGH (ref 0–0.9)
MONOCYTES NFR BLD AUTO: 12.8 % — SIGNIFICANT CHANGE UP (ref 2–14)
NEUTROPHILS # BLD AUTO: 5.71 K/UL — SIGNIFICANT CHANGE UP (ref 1.8–7.4)
NEUTROPHILS NFR BLD AUTO: 56.1 % — SIGNIFICANT CHANGE UP (ref 43–77)
NRBC # BLD AUTO: 0 K/UL — SIGNIFICANT CHANGE UP (ref 0–0)
NRBC # FLD: 0 K/UL — SIGNIFICANT CHANGE UP (ref 0–0)
NRBC BLD AUTO-RTO: 0 /100 WBCS — SIGNIFICANT CHANGE UP (ref 0–0)
PHOSPHATE SERPL-MCNC: 3.6 MG/DL — SIGNIFICANT CHANGE UP (ref 2.5–4.5)
PLATELET # BLD AUTO: 293 K/UL — SIGNIFICANT CHANGE UP (ref 150–400)
POTASSIUM SERPL-MCNC: 3.5 MMOL/L — SIGNIFICANT CHANGE UP (ref 3.5–5.3)
POTASSIUM SERPL-SCNC: 3.5 MMOL/L — SIGNIFICANT CHANGE UP (ref 3.5–5.3)
RBC # BLD: 3.63 M/UL — LOW (ref 3.8–5.2)
RBC # FLD: 16.4 % — HIGH (ref 10.3–14.5)
SODIUM SERPL-SCNC: 138 MMOL/L — SIGNIFICANT CHANGE UP (ref 135–145)
WBC # BLD: 10.18 K/UL — SIGNIFICANT CHANGE UP (ref 3.8–10.5)
WBC # FLD AUTO: 10.18 K/UL — SIGNIFICANT CHANGE UP (ref 3.8–10.5)

## 2025-06-04 PROCEDURE — 99222 1ST HOSP IP/OBS MODERATE 55: CPT | Mod: GC

## 2025-06-04 PROCEDURE — 99233 SBSQ HOSP IP/OBS HIGH 50: CPT

## 2025-06-04 RX ORDER — NAPROXEN SODIUM 275 MG
250 TABLET ORAL
Refills: 0 | Status: DISCONTINUED | OUTPATIENT
Start: 2025-06-04 | End: 2025-06-07

## 2025-06-04 RX ADMIN — Medication 1 DROP(S): at 12:08

## 2025-06-04 RX ADMIN — Medication 1 DROP(S): at 17:04

## 2025-06-04 RX ADMIN — ENOXAPARIN SODIUM 40 MILLIGRAM(S): 100 INJECTION SUBCUTANEOUS at 05:07

## 2025-06-04 RX ADMIN — SODIUM CHLORIDE 150 MILLILITER(S): 9 INJECTION, SOLUTION INTRAVENOUS at 06:16

## 2025-06-04 RX ADMIN — Medication 650 MILLIGRAM(S): at 00:00

## 2025-06-04 RX ADMIN — Medication 250 MILLIGRAM(S): at 22:46

## 2025-06-04 RX ADMIN — Medication 4 MILLIGRAM(S): at 12:57

## 2025-06-04 RX ADMIN — Medication 650 MILLIGRAM(S): at 05:54

## 2025-06-04 RX ADMIN — Medication 4 MILLIGRAM(S): at 21:37

## 2025-06-04 RX ADMIN — SODIUM CHLORIDE 150 MILLILITER(S): 9 INJECTION, SOLUTION INTRAVENOUS at 14:12

## 2025-06-04 RX ADMIN — Medication 4 MILLIGRAM(S): at 13:55

## 2025-06-04 RX ADMIN — Medication 4 MILLIGRAM(S): at 06:00

## 2025-06-04 RX ADMIN — Medication 650 MILLIGRAM(S): at 06:00

## 2025-06-04 RX ADMIN — Medication 1 APPLICATION(S): at 17:07

## 2025-06-04 RX ADMIN — Medication 650 MILLIGRAM(S): at 23:15

## 2025-06-04 RX ADMIN — Medication 250 MILLIGRAM(S): at 23:16

## 2025-06-04 RX ADMIN — Medication 650 MILLIGRAM(S): at 22:45

## 2025-06-04 RX ADMIN — Medication 4 MILLIGRAM(S): at 21:22

## 2025-06-04 RX ADMIN — ENOXAPARIN SODIUM 40 MILLIGRAM(S): 100 INJECTION SUBCUTANEOUS at 17:05

## 2025-06-04 RX ADMIN — Medication 4 MILLIGRAM(S): at 05:08

## 2025-06-05 LAB
ANION GAP SERPL CALC-SCNC: 12 MMOL/L — SIGNIFICANT CHANGE UP (ref 7–14)
BASOPHILS # BLD AUTO: 0.04 K/UL — SIGNIFICANT CHANGE UP (ref 0–0.2)
BASOPHILS NFR BLD AUTO: 0.5 % — SIGNIFICANT CHANGE UP (ref 0–2)
BUN SERPL-MCNC: 3 MG/DL — LOW (ref 7–23)
CALCIUM SERPL-MCNC: 9.1 MG/DL — SIGNIFICANT CHANGE UP (ref 8.4–10.5)
CHLORIDE SERPL-SCNC: 104 MMOL/L — SIGNIFICANT CHANGE UP (ref 98–107)
CO2 SERPL-SCNC: 25 MMOL/L — SIGNIFICANT CHANGE UP (ref 22–31)
CREAT SERPL-MCNC: 0.6 MG/DL — SIGNIFICANT CHANGE UP (ref 0.5–1.3)
EGFR: 121 ML/MIN/1.73M2 — SIGNIFICANT CHANGE UP
EGFR: 121 ML/MIN/1.73M2 — SIGNIFICANT CHANGE UP
EOSINOPHIL # BLD AUTO: 0.51 K/UL — HIGH (ref 0–0.5)
EOSINOPHIL NFR BLD AUTO: 6 % — SIGNIFICANT CHANGE UP (ref 0–6)
GLUCOSE SERPL-MCNC: 80 MG/DL — SIGNIFICANT CHANGE UP (ref 70–99)
HCT VFR BLD CALC: 31.6 % — LOW (ref 34.5–45)
HGB BLD-MCNC: 11 G/DL — LOW (ref 11.5–15.5)
IANC: 4.28 K/UL — SIGNIFICANT CHANGE UP (ref 1.8–7.4)
IMM GRANULOCYTES NFR BLD AUTO: 0.2 % — SIGNIFICANT CHANGE UP (ref 0–0.9)
LIDOCAIN IGE QN: 182 U/L — HIGH (ref 7–60)
LYMPHOCYTES # BLD AUTO: 2.51 K/UL — SIGNIFICANT CHANGE UP (ref 1–3.3)
LYMPHOCYTES # BLD AUTO: 29.5 % — SIGNIFICANT CHANGE UP (ref 13–44)
MAGNESIUM SERPL-MCNC: 2.1 MG/DL — SIGNIFICANT CHANGE UP (ref 1.6–2.6)
MCHC RBC-ENTMCNC: 34.4 PG — HIGH (ref 27–34)
MCHC RBC-ENTMCNC: 34.8 G/DL — SIGNIFICANT CHANGE UP (ref 32–36)
MCV RBC AUTO: 98.8 FL — SIGNIFICANT CHANGE UP (ref 80–100)
MONOCYTES # BLD AUTO: 1.14 K/UL — HIGH (ref 0–0.9)
MONOCYTES NFR BLD AUTO: 13.4 % — SIGNIFICANT CHANGE UP (ref 2–14)
NEUTROPHILS # BLD AUTO: 4.28 K/UL — SIGNIFICANT CHANGE UP (ref 1.8–7.4)
NEUTROPHILS NFR BLD AUTO: 50.4 % — SIGNIFICANT CHANGE UP (ref 43–77)
NRBC # BLD AUTO: 0 K/UL — SIGNIFICANT CHANGE UP (ref 0–0)
NRBC # FLD: 0 K/UL — SIGNIFICANT CHANGE UP (ref 0–0)
NRBC BLD AUTO-RTO: 0 /100 WBCS — SIGNIFICANT CHANGE UP (ref 0–0)
PHOSPHATE SERPL-MCNC: 4.1 MG/DL — SIGNIFICANT CHANGE UP (ref 2.5–4.5)
PLATELET # BLD AUTO: 336 K/UL — SIGNIFICANT CHANGE UP (ref 150–400)
POTASSIUM SERPL-MCNC: 4 MMOL/L — SIGNIFICANT CHANGE UP (ref 3.5–5.3)
POTASSIUM SERPL-SCNC: 4 MMOL/L — SIGNIFICANT CHANGE UP (ref 3.5–5.3)
RBC # BLD: 3.2 M/UL — LOW (ref 3.8–5.2)
RBC # FLD: 16 % — HIGH (ref 10.3–14.5)
SODIUM SERPL-SCNC: 141 MMOL/L — SIGNIFICANT CHANGE UP (ref 135–145)
WBC # BLD: 8.5 K/UL — SIGNIFICANT CHANGE UP (ref 3.8–10.5)
WBC # FLD AUTO: 8.5 K/UL — SIGNIFICANT CHANGE UP (ref 3.8–10.5)

## 2025-06-05 PROCEDURE — 99232 SBSQ HOSP IP/OBS MODERATE 35: CPT

## 2025-06-05 RX ADMIN — Medication 1 APPLICATION(S): at 11:24

## 2025-06-05 RX ADMIN — Medication 1 DROP(S): at 11:23

## 2025-06-05 RX ADMIN — Medication 1 DROP(S): at 18:08

## 2025-06-05 RX ADMIN — Medication 4 MILLIGRAM(S): at 05:28

## 2025-06-05 RX ADMIN — ENOXAPARIN SODIUM 40 MILLIGRAM(S): 100 INJECTION SUBCUTANEOUS at 05:21

## 2025-06-05 RX ADMIN — ENOXAPARIN SODIUM 40 MILLIGRAM(S): 100 INJECTION SUBCUTANEOUS at 18:08

## 2025-06-05 RX ADMIN — Medication 1 DROP(S): at 22:05

## 2025-06-05 RX ADMIN — Medication 2 MILLIGRAM(S): at 15:24

## 2025-06-05 RX ADMIN — Medication 2 MILLIGRAM(S): at 14:24

## 2025-06-05 RX ADMIN — Medication 250 MILLIGRAM(S): at 05:13

## 2025-06-05 RX ADMIN — Medication 1 DROP(S): at 05:13

## 2025-06-05 RX ADMIN — Medication 250 MILLIGRAM(S): at 18:09

## 2025-06-05 RX ADMIN — Medication 1 DROP(S): at 00:39

## 2025-06-05 RX ADMIN — SODIUM CHLORIDE 150 MILLILITER(S): 9 INJECTION, SOLUTION INTRAVENOUS at 19:44

## 2025-06-05 RX ADMIN — Medication 4 MILLIGRAM(S): at 05:13

## 2025-06-05 RX ADMIN — Medication 250 MILLIGRAM(S): at 05:43

## 2025-06-06 ENCOUNTER — TRANSCRIPTION ENCOUNTER (OUTPATIENT)
Age: 35
End: 2025-06-06

## 2025-06-06 LAB
ANION GAP SERPL CALC-SCNC: 16 MMOL/L — HIGH (ref 7–14)
BASOPHILS # BLD AUTO: 0.05 K/UL — SIGNIFICANT CHANGE UP (ref 0–0.2)
BASOPHILS NFR BLD AUTO: 0.7 % — SIGNIFICANT CHANGE UP (ref 0–2)
BUN SERPL-MCNC: 3 MG/DL — LOW (ref 7–23)
CALCIUM SERPL-MCNC: 9 MG/DL — SIGNIFICANT CHANGE UP (ref 8.4–10.5)
CHLORIDE SERPL-SCNC: 100 MMOL/L — SIGNIFICANT CHANGE UP (ref 98–107)
CO2 SERPL-SCNC: 20 MMOL/L — LOW (ref 22–31)
CREAT SERPL-MCNC: 0.54 MG/DL — SIGNIFICANT CHANGE UP (ref 0.5–1.3)
EGFR: 125 ML/MIN/1.73M2 — SIGNIFICANT CHANGE UP
EGFR: 125 ML/MIN/1.73M2 — SIGNIFICANT CHANGE UP
EOSINOPHIL # BLD AUTO: 0.52 K/UL — HIGH (ref 0–0.5)
EOSINOPHIL NFR BLD AUTO: 6.8 % — HIGH (ref 0–6)
GLUCOSE SERPL-MCNC: 80 MG/DL — SIGNIFICANT CHANGE UP (ref 70–99)
HCT VFR BLD CALC: 34.5 % — SIGNIFICANT CHANGE UP (ref 34.5–45)
HGB BLD-MCNC: 11.6 G/DL — SIGNIFICANT CHANGE UP (ref 11.5–15.5)
IANC: 3.8 K/UL — SIGNIFICANT CHANGE UP (ref 1.8–7.4)
IMM GRANULOCYTES NFR BLD AUTO: 0.3 % — SIGNIFICANT CHANGE UP (ref 0–0.9)
LIDOCAIN IGE QN: 141 U/L — HIGH (ref 7–60)
LYMPHOCYTES # BLD AUTO: 2.41 K/UL — SIGNIFICANT CHANGE UP (ref 1–3.3)
LYMPHOCYTES # BLD AUTO: 31.6 % — SIGNIFICANT CHANGE UP (ref 13–44)
MAGNESIUM SERPL-MCNC: 2 MG/DL — SIGNIFICANT CHANGE UP (ref 1.6–2.6)
MCHC RBC-ENTMCNC: 33.6 G/DL — SIGNIFICANT CHANGE UP (ref 32–36)
MCHC RBC-ENTMCNC: 34 PG — SIGNIFICANT CHANGE UP (ref 27–34)
MCV RBC AUTO: 101.2 FL — HIGH (ref 80–100)
MONOCYTES # BLD AUTO: 0.82 K/UL — SIGNIFICANT CHANGE UP (ref 0–0.9)
MONOCYTES NFR BLD AUTO: 10.8 % — SIGNIFICANT CHANGE UP (ref 2–14)
NEUTROPHILS # BLD AUTO: 3.8 K/UL — SIGNIFICANT CHANGE UP (ref 1.8–7.4)
NEUTROPHILS NFR BLD AUTO: 49.8 % — SIGNIFICANT CHANGE UP (ref 43–77)
NRBC # BLD AUTO: 0 K/UL — SIGNIFICANT CHANGE UP (ref 0–0)
NRBC # FLD: 0 K/UL — SIGNIFICANT CHANGE UP (ref 0–0)
NRBC BLD AUTO-RTO: 0 /100 WBCS — SIGNIFICANT CHANGE UP (ref 0–0)
PHOSPHATE SERPL-MCNC: 3.9 MG/DL — SIGNIFICANT CHANGE UP (ref 2.5–4.5)
PLATELET # BLD AUTO: 363 K/UL — SIGNIFICANT CHANGE UP (ref 150–400)
POTASSIUM SERPL-MCNC: 3.5 MMOL/L — SIGNIFICANT CHANGE UP (ref 3.5–5.3)
POTASSIUM SERPL-SCNC: 3.5 MMOL/L — SIGNIFICANT CHANGE UP (ref 3.5–5.3)
RBC # BLD: 3.41 M/UL — LOW (ref 3.8–5.2)
RBC # FLD: 16.3 % — HIGH (ref 10.3–14.5)
SODIUM SERPL-SCNC: 136 MMOL/L — SIGNIFICANT CHANGE UP (ref 135–145)
WBC # BLD: 7.62 K/UL — SIGNIFICANT CHANGE UP (ref 3.8–10.5)
WBC # FLD AUTO: 7.62 K/UL — SIGNIFICANT CHANGE UP (ref 3.8–10.5)

## 2025-06-06 PROCEDURE — 99232 SBSQ HOSP IP/OBS MODERATE 35: CPT

## 2025-06-06 RX ADMIN — ENOXAPARIN SODIUM 40 MILLIGRAM(S): 100 INJECTION SUBCUTANEOUS at 18:07

## 2025-06-06 RX ADMIN — ENOXAPARIN SODIUM 40 MILLIGRAM(S): 100 INJECTION SUBCUTANEOUS at 05:42

## 2025-06-06 RX ADMIN — Medication 250 MILLIGRAM(S): at 18:07

## 2025-06-06 RX ADMIN — Medication 250 MILLIGRAM(S): at 05:42

## 2025-06-06 RX ADMIN — Medication 1 DROP(S): at 11:27

## 2025-06-06 RX ADMIN — SODIUM CHLORIDE 150 MILLILITER(S): 9 INJECTION, SOLUTION INTRAVENOUS at 09:55

## 2025-06-06 RX ADMIN — SODIUM CHLORIDE 150 MILLILITER(S): 9 INJECTION, SOLUTION INTRAVENOUS at 17:11

## 2025-06-06 RX ADMIN — Medication 250 MILLIGRAM(S): at 06:49

## 2025-06-06 RX ADMIN — Medication 1 APPLICATION(S): at 11:28

## 2025-06-06 RX ADMIN — Medication 4 MILLIGRAM(S): at 03:33

## 2025-06-06 RX ADMIN — Medication 1 DROP(S): at 18:06

## 2025-06-06 RX ADMIN — Medication 1 DROP(S): at 05:42

## 2025-06-06 RX ADMIN — Medication 4 MILLIGRAM(S): at 03:51

## 2025-06-07 ENCOUNTER — TRANSCRIPTION ENCOUNTER (OUTPATIENT)
Age: 35
End: 2025-06-07

## 2025-06-07 VITALS
HEART RATE: 93 BPM | DIASTOLIC BLOOD PRESSURE: 77 MMHG | RESPIRATION RATE: 18 BRPM | TEMPERATURE: 98 F | SYSTOLIC BLOOD PRESSURE: 138 MMHG | OXYGEN SATURATION: 100 %

## 2025-06-07 LAB
ANION GAP SERPL CALC-SCNC: 14 MMOL/L — SIGNIFICANT CHANGE UP (ref 7–14)
BASOPHILS # BLD AUTO: 0.06 K/UL — SIGNIFICANT CHANGE UP (ref 0–0.2)
BASOPHILS NFR BLD AUTO: 0.7 % — SIGNIFICANT CHANGE UP (ref 0–2)
BUN SERPL-MCNC: 4 MG/DL — LOW (ref 7–23)
CALCIUM SERPL-MCNC: 9.3 MG/DL — SIGNIFICANT CHANGE UP (ref 8.4–10.5)
CHLORIDE SERPL-SCNC: 104 MMOL/L — SIGNIFICANT CHANGE UP (ref 98–107)
CO2 SERPL-SCNC: 22 MMOL/L — SIGNIFICANT CHANGE UP (ref 22–31)
CREAT SERPL-MCNC: 0.57 MG/DL — SIGNIFICANT CHANGE UP (ref 0.5–1.3)
EGFR: 123 ML/MIN/1.73M2 — SIGNIFICANT CHANGE UP
EGFR: 123 ML/MIN/1.73M2 — SIGNIFICANT CHANGE UP
EOSINOPHIL # BLD AUTO: 0.45 K/UL — SIGNIFICANT CHANGE UP (ref 0–0.5)
EOSINOPHIL NFR BLD AUTO: 5.2 % — SIGNIFICANT CHANGE UP (ref 0–6)
GLUCOSE SERPL-MCNC: 93 MG/DL — SIGNIFICANT CHANGE UP (ref 70–99)
HCT VFR BLD CALC: 34 % — LOW (ref 34.5–45)
HGB BLD-MCNC: 12 G/DL — SIGNIFICANT CHANGE UP (ref 11.5–15.5)
IANC: 4.36 K/UL — SIGNIFICANT CHANGE UP (ref 1.8–7.4)
IMM GRANULOCYTES NFR BLD AUTO: 0.3 % — SIGNIFICANT CHANGE UP (ref 0–0.9)
LIDOCAIN IGE QN: 177 U/L — HIGH (ref 7–60)
LYMPHOCYTES # BLD AUTO: 2.88 K/UL — SIGNIFICANT CHANGE UP (ref 1–3.3)
LYMPHOCYTES # BLD AUTO: 33.4 % — SIGNIFICANT CHANGE UP (ref 13–44)
MAGNESIUM SERPL-MCNC: 2 MG/DL — SIGNIFICANT CHANGE UP (ref 1.6–2.6)
MCHC RBC-ENTMCNC: 34.2 PG — HIGH (ref 27–34)
MCHC RBC-ENTMCNC: 35.3 G/DL — SIGNIFICANT CHANGE UP (ref 32–36)
MCV RBC AUTO: 96.9 FL — SIGNIFICANT CHANGE UP (ref 80–100)
MONOCYTES # BLD AUTO: 0.85 K/UL — SIGNIFICANT CHANGE UP (ref 0–0.9)
MONOCYTES NFR BLD AUTO: 9.8 % — SIGNIFICANT CHANGE UP (ref 2–14)
NEUTROPHILS # BLD AUTO: 4.36 K/UL — SIGNIFICANT CHANGE UP (ref 1.8–7.4)
NEUTROPHILS NFR BLD AUTO: 50.6 % — SIGNIFICANT CHANGE UP (ref 43–77)
NRBC # BLD AUTO: 0 K/UL — SIGNIFICANT CHANGE UP (ref 0–0)
NRBC # FLD: 0 K/UL — SIGNIFICANT CHANGE UP (ref 0–0)
NRBC BLD AUTO-RTO: 0 /100 WBCS — SIGNIFICANT CHANGE UP (ref 0–0)
PHOSPHATE SERPL-MCNC: 4.4 MG/DL — SIGNIFICANT CHANGE UP (ref 2.5–4.5)
PLATELET # BLD AUTO: 400 K/UL — SIGNIFICANT CHANGE UP (ref 150–400)
POTASSIUM SERPL-MCNC: 3.6 MMOL/L — SIGNIFICANT CHANGE UP (ref 3.5–5.3)
POTASSIUM SERPL-SCNC: 3.6 MMOL/L — SIGNIFICANT CHANGE UP (ref 3.5–5.3)
RBC # BLD: 3.51 M/UL — LOW (ref 3.8–5.2)
RBC # FLD: 15.9 % — HIGH (ref 10.3–14.5)
SODIUM SERPL-SCNC: 140 MMOL/L — SIGNIFICANT CHANGE UP (ref 135–145)
WBC # BLD: 8.63 K/UL — SIGNIFICANT CHANGE UP (ref 3.8–10.5)
WBC # FLD AUTO: 8.63 K/UL — SIGNIFICANT CHANGE UP (ref 3.8–10.5)

## 2025-06-07 PROCEDURE — 99239 HOSP IP/OBS DSCHRG MGMT >30: CPT

## 2025-06-07 RX ORDER — NAPROXEN SODIUM 275 MG
1 TABLET ORAL
Qty: 28 | Refills: 0
Start: 2025-06-07 | End: 2025-06-20

## 2025-06-07 RX ORDER — OXYCODONE HYDROCHLORIDE 30 MG/1
1 TABLET ORAL
Qty: 12 | Refills: 0
Start: 2025-06-07 | End: 2025-06-09

## 2025-06-07 RX ADMIN — Medication 250 MILLIGRAM(S): at 06:05

## 2025-06-07 RX ADMIN — Medication 250 MILLIGRAM(S): at 06:55

## 2025-06-07 RX ADMIN — Medication 40 MILLIGRAM(S): at 06:05

## 2025-06-07 RX ADMIN — Medication 1 DROP(S): at 06:05

## 2025-06-07 RX ADMIN — ENOXAPARIN SODIUM 40 MILLIGRAM(S): 100 INJECTION SUBCUTANEOUS at 06:05

## 2025-06-07 RX ADMIN — Medication 1 DROP(S): at 00:15
